# Patient Record
Sex: FEMALE | Race: WHITE | NOT HISPANIC OR LATINO | Employment: OTHER | URBAN - METROPOLITAN AREA
[De-identification: names, ages, dates, MRNs, and addresses within clinical notes are randomized per-mention and may not be internally consistent; named-entity substitution may affect disease eponyms.]

---

## 2021-08-19 ENCOUNTER — OFFICE VISIT (OUTPATIENT)
Dept: CARDIOLOGY | Facility: CLINIC | Age: 66
End: 2021-08-19

## 2021-08-19 VITALS
SYSTOLIC BLOOD PRESSURE: 110 MMHG | HEART RATE: 56 BPM | BODY MASS INDEX: 29.53 KG/M2 | DIASTOLIC BLOOD PRESSURE: 70 MMHG | WEIGHT: 173 LBS | HEIGHT: 64 IN

## 2021-08-19 DIAGNOSIS — E78.41 ELEVATED LIPOPROTEIN(A): ICD-10-CM

## 2021-08-19 DIAGNOSIS — I10 ESSENTIAL HYPERTENSION: ICD-10-CM

## 2021-08-19 DIAGNOSIS — I25.84 CORONARY ARTERY CALCIFICATION: Primary | ICD-10-CM

## 2021-08-19 DIAGNOSIS — I25.10 CORONARY ARTERY CALCIFICATION: Primary | ICD-10-CM

## 2021-08-19 PROCEDURE — 99204 OFFICE O/P NEW MOD 45 MIN: CPT | Performed by: INTERNAL MEDICINE

## 2021-08-19 PROCEDURE — 93000 ELECTROCARDIOGRAM COMPLETE: CPT | Performed by: INTERNAL MEDICINE

## 2021-08-19 RX ORDER — CHOLECALCIFEROL (VITAMIN D3) 125 MCG
4000 CAPSULE ORAL DAILY
COMMUNITY

## 2021-08-19 RX ORDER — LEVOTHYROXINE SODIUM 0.03 MG/1
25 TABLET ORAL DAILY
COMMUNITY
Start: 2021-07-14

## 2021-08-19 RX ORDER — ROSUVASTATIN CALCIUM 10 MG/1
10 TABLET, COATED ORAL DAILY
COMMUNITY
Start: 2021-07-14 | End: 2021-09-21 | Stop reason: SDUPTHER

## 2021-08-19 RX ORDER — LISINOPRIL AND HYDROCHLOROTHIAZIDE 20; 12.5 MG/1; MG/1
TABLET ORAL DAILY
COMMUNITY
Start: 2021-07-14

## 2021-08-19 NOTE — PROGRESS NOTES
Carrie Soto  1955  Date of Office Visit: 08/19/21  Encounter Provider: John Aguilera MD  Place of Service: [facility] CARDIOLOGY      CHIEF COMPLAINT:  Elevated coronary calcium score  Family history of early coronary artery disease and myocardial infarction    HISTORY OF PRESENT ILLNESS: I had the pleasure of seeing Ms. Soto in consultation today.  She is a very pleasant 66-year-old female with a medical history of hyperlipidemia, hypertension and a family history of early coronary artery disease, and early myocardial infarction, who presents to me secondary to her family history but also elevated coronary artery calcium score.  She has been on a statin actually since 2001 which is great for her.  She is currently on Crestor 10 mg p.o. daily.  She tolerates this well with no myalgias.  She has close follow-up with her primary care and did have an elevated lipoprotein a at 528 on her last lab work.  Her LDL was just very slightly up at 83 from goal and total cholesterol looked good at 176.  Her HDL is great at 76.  She denies any chest pain or dyspnea on exertion.  She not had any recent stress testing.  She did have a coronary artery calcium score which I have reviewed.  Her coronary artery calcium score was measured at 309 with calcification in the LAD and RCA above 100 and a score of 56 in the circumflex.  The left main was clean.    Review of Systems   Constitutional: Negative for fever and malaise/fatigue.   HENT: Negative for nosebleeds and sore throat.    Eyes: Negative for blurred vision and double vision.   Cardiovascular: Negative for chest pain, claudication, palpitations and syncope.   Respiratory: Negative for cough, shortness of breath and snoring.    Endocrine: Negative for cold intolerance, heat intolerance and polydipsia.   Skin: Negative for itching, poor wound healing and rash.   Musculoskeletal: Negative for joint pain, joint swelling, muscle  "weakness and myalgias.   Gastrointestinal: Negative for abdominal pain, melena, nausea and vomiting.   Neurological: Negative for light-headedness, loss of balance, seizures, vertigo and weakness.   Psychiatric/Behavioral: Negative for altered mental status and depression.          Past Medical History:   Diagnosis Date   • Cancer (CMS/HCC) 2009    breast/  Chemo and radiation   • Hyperlipidemia    • Hypertension    • Sleep apnea     uses c-pap machine   • Thyroid disorder        The following portions of the patient's history were reviewed and updated as appropriate: Social history , Family history and Surgical history     Current Outpatient Medications on File Prior to Visit   Medication Sig Dispense Refill   • Calcium Carbonate-Vitamin D (CALCIUM-D PO) Take  by mouth Daily.     • Cholecalciferol (Vitamin D3) 50 MCG (2000 UT) tablet Take 4,000 Units by mouth Daily.     • levothyroxine (SYNTHROID, LEVOTHROID) 25 MCG tablet Take 25 mcg by mouth Daily.     • lisinopril-hydrochlorothiazide (PRINZIDE,ZESTORETIC) 20-12.5 MG per tablet Daily.     • rosuvastatin (CRESTOR) 10 MG tablet Take 10 mg by mouth Daily.       No current facility-administered medications on file prior to visit.       Allergies   Allergen Reactions   • Codeine Nausea Only       Vitals:    08/19/21 1029   BP: 110/70   Pulse: 56   Weight: 78.5 kg (173 lb)   Height: 162.6 cm (64\")     Body mass index is 29.7 kg/m².   Constitutional:       Appearance: Well-developed.   Eyes:      General: No scleral icterus.     Conjunctiva/sclera: Conjunctivae normal.   HENT:      Head: Normocephalic and atraumatic.   Neck:      Thyroid: No thyromegaly.      Vascular: Normal carotid pulses. No carotid bruit, hepatojugular reflux or JVD.      Trachea: No tracheal deviation.   Pulmonary:      Effort: No respiratory distress.      Breath sounds: Normal breath sounds. No decreased breath sounds. No wheezing. No rhonchi. No rales.   Chest:      Chest wall: Not tender to " palpatation.   Cardiovascular:      Normal rate. Regular rhythm.      No gallop.   Pulses:     Carotid: 2+ bilaterally.     Radial: 2+ bilaterally.     Femoral: 2+ bilaterally.     Dorsalis pedis: 2+ bilaterally.     Posterior tibial: 2+ bilaterally.  Edema:     Peripheral edema absent.   Abdominal:      General: Bowel sounds are normal. There is no distension.      Palpations: Abdomen is soft.      Tenderness: There is no abdominal tenderness.   Musculoskeletal:         General: No deformity.      Cervical back: Normal range of motion and neck supple. Skin:     Findings: No erythema or rash.   Neurological:      Mental Status: Alert and oriented to person, place, and time.      Sensory: No sensory deficit.   Psychiatric:         Behavior: Behavior normal.              ECG 12 Lead    Date/Time: 8/19/2021 11:08 AM  Performed by: John Aguilera MD  Authorized by: John Aguilera MD   Comparison: not compared with previous ECG   Rhythm: sinus rhythm  Rate: normal  QRS axis: normal    Clinical impression: normal ECG                 DISCUSSION/SUMMARY very pleasant 66-year-old female with a medical history of coronary artery calcification, hyperlipidemia, hypertension and family history of early coronary artery disease.  She presents to me to establish care.  Thankfully she has been on statin therapy for a prolonged period of time and I think this may have played a significant role in keeping her calcification level low.  That being said I do think with her family history and the presence of coronary artery calcification along with elevated lipoprotein a we need to shoot for a lower LDL.  I will increase her Crestor to 20 mg p.o. daily.  She will also be set up for stress testing as well.  Overall I think she is doing fine and I encouraged her to continue to be active and to work on dietary modification.    1.  Coronary artery calcification/hyperlipidemia  -I recommend we double her Crestor to 20 mg p.o.  daily.  She seem to be tolerating this well with no elevation in transaminases or myalgias.  -Standard treadmill stress test will be ordered.  I do not think we need to do any additional evaluation on top of that.    2.  Essential hypertension: Well-controlled.  Continue lisinopril-HCTZ therapy at current dose.  She is tolerating this well with no hyperkalemia or renal insufficiency on this therapy.    I will see her back in 1 year or earlier with problems.

## 2021-08-24 ENCOUNTER — HOSPITAL ENCOUNTER (OUTPATIENT)
Dept: CARDIOLOGY | Facility: HOSPITAL | Age: 66
Discharge: HOME | End: 2021-08-24
Admitting: INTERNAL MEDICINE

## 2021-08-24 DIAGNOSIS — I25.84 CORONARY ARTERY CALCIFICATION: ICD-10-CM

## 2021-08-24 DIAGNOSIS — I25.10 CORONARY ARTERY CALCIFICATION: ICD-10-CM

## 2021-08-24 LAB
BH CV STRESS BP STAGE 1: NORMAL
BH CV STRESS BP STAGE 2: NORMAL
BH CV STRESS BP STAGE 3: NORMAL
BH CV STRESS DURATION MIN STAGE 1: 3
BH CV STRESS DURATION MIN STAGE 2: 3
BH CV STRESS DURATION MIN STAGE 3: 3
BH CV STRESS DURATION MIN STAGE 4: 0
BH CV STRESS DURATION SEC STAGE 1: 0
BH CV STRESS DURATION SEC STAGE 2: 0
BH CV STRESS DURATION SEC STAGE 3: 0
BH CV STRESS DURATION SEC STAGE 4: 30
BH CV STRESS GRADE STAGE 1: 10
BH CV STRESS GRADE STAGE 2: 12
BH CV STRESS GRADE STAGE 3: 14
BH CV STRESS GRADE STAGE 4: 16
BH CV STRESS HR STAGE 1: 93
BH CV STRESS HR STAGE 2: 122
BH CV STRESS HR STAGE 3: 152
BH CV STRESS HR STAGE 4: 158
BH CV STRESS METS STAGE 1: 5
BH CV STRESS METS STAGE 2: 7.5
BH CV STRESS METS STAGE 3: 10
BH CV STRESS METS STAGE 4: 10.5
BH CV STRESS PROTOCOL 1: NORMAL
BH CV STRESS RECOVERY BP: NORMAL MMHG
BH CV STRESS RECOVERY HR: 85 BPM
BH CV STRESS SPEED STAGE 1: 1.7
BH CV STRESS SPEED STAGE 2: 2.5
BH CV STRESS SPEED STAGE 3: 3.4
BH CV STRESS SPEED STAGE 4: 4.2
BH CV STRESS STAGE 1: 1
BH CV STRESS STAGE 2: 2
BH CV STRESS STAGE 3: 3
BH CV STRESS STAGE 4: 4
MAXIMAL PREDICTED HEART RATE: 155 BPM
PERCENT MAX PREDICTED HR: 101.94 %
STRESS BASELINE BP: NORMAL MMHG
STRESS BASELINE HR: 61 BPM
STRESS PERCENT HR: 120 %
STRESS POST ESTIMATED WORKLOAD: 10.5 METS
STRESS POST EXERCISE DUR MIN: 9 MIN
STRESS POST EXERCISE DUR SEC: 30 SEC
STRESS POST PEAK BP: NORMAL MMHG
STRESS POST PEAK HR: 158 BPM
STRESS TARGET HR: 132 BPM

## 2021-08-24 PROCEDURE — 93018 CV STRESS TEST I&R ONLY: CPT | Performed by: INTERNAL MEDICINE

## 2021-08-24 PROCEDURE — 93017 CV STRESS TEST TRACING ONLY: CPT

## 2021-08-24 PROCEDURE — 93016 CV STRESS TEST SUPVJ ONLY: CPT | Performed by: INTERNAL MEDICINE

## 2021-08-25 ENCOUNTER — TELEPHONE (OUTPATIENT)
Dept: CARDIOLOGY | Facility: CLINIC | Age: 66
End: 2021-08-25

## 2021-08-25 NOTE — TELEPHONE ENCOUNTER
----- Message from John Aguilera MD sent at 8/24/2021 11:15 AM EDT -----  Normal stress  Let her know

## 2021-09-21 RX ORDER — ROSUVASTATIN CALCIUM 10 MG/1
10 TABLET, COATED ORAL DAILY
Qty: 90 TABLET | Refills: 3 | Status: SHIPPED | OUTPATIENT
Start: 2021-09-21 | End: 2021-09-21 | Stop reason: SDUPTHER

## 2021-09-21 RX ORDER — ROSUVASTATIN CALCIUM 20 MG/1
20 TABLET, COATED ORAL DAILY
Qty: 90 TABLET | Refills: 3 | Status: SHIPPED | OUTPATIENT
Start: 2021-09-21 | End: 2022-05-23

## 2021-10-20 ENCOUNTER — IMMUNIZATION (OUTPATIENT)
Dept: VACCINE CLINIC | Facility: HOSPITAL | Age: 66
End: 2021-10-20

## 2021-10-20 PROCEDURE — 91300 HC SARSCOV02 VAC 30MCG/0.3ML IM: CPT | Performed by: INTERNAL MEDICINE

## 2021-10-20 PROCEDURE — 0004A ADM SARSCOV2 30MCG/0.3ML BOOSTER: CPT | Performed by: INTERNAL MEDICINE

## 2021-11-01 ENCOUNTER — TELEPHONE (OUTPATIENT)
Dept: GASTROENTEROLOGY | Facility: CLINIC | Age: 66
End: 2021-11-01

## 2021-11-02 ENCOUNTER — PREP FOR SURGERY (OUTPATIENT)
Dept: SURGERY | Facility: SURGERY CENTER | Age: 66
End: 2021-11-02

## 2021-11-02 DIAGNOSIS — Z12.11 ENCOUNTER FOR SCREENING FOR MALIGNANT NEOPLASM OF COLON: Primary | ICD-10-CM

## 2021-11-02 RX ORDER — SODIUM CHLORIDE 0.9 % (FLUSH) 0.9 %
10 SYRINGE (ML) INJECTION AS NEEDED
Status: CANCELLED | OUTPATIENT
Start: 2021-11-02

## 2021-11-02 RX ORDER — SODIUM CHLORIDE 0.9 % (FLUSH) 0.9 %
3 SYRINGE (ML) INJECTION EVERY 12 HOURS SCHEDULED
Status: CANCELLED | OUTPATIENT
Start: 2021-11-02

## 2021-11-02 RX ORDER — SODIUM CHLORIDE, SODIUM LACTATE, POTASSIUM CHLORIDE, CALCIUM CHLORIDE 600; 310; 30; 20 MG/100ML; MG/100ML; MG/100ML; MG/100ML
30 INJECTION, SOLUTION INTRAVENOUS CONTINUOUS PRN
Status: CANCELLED | OUTPATIENT
Start: 2021-11-02

## 2021-11-03 PROBLEM — Z12.11 ENCOUNTER FOR SCREENING FOR MALIGNANT NEOPLASM OF COLON: Status: ACTIVE | Noted: 2021-11-03

## 2022-02-25 RX ORDER — CHOLECALCIFEROL (VITAMIN D3) 125 MCG
500 CAPSULE ORAL DAILY
COMMUNITY
End: 2022-04-14 | Stop reason: ALTCHOICE

## 2022-03-01 ENCOUNTER — ANESTHESIA (OUTPATIENT)
Dept: SURGERY | Facility: SURGERY CENTER | Age: 67
End: 2022-03-01

## 2022-03-01 ENCOUNTER — ANESTHESIA EVENT (OUTPATIENT)
Dept: SURGERY | Facility: SURGERY CENTER | Age: 67
End: 2022-03-01

## 2022-03-01 ENCOUNTER — HOSPITAL ENCOUNTER (OUTPATIENT)
Facility: SURGERY CENTER | Age: 67
Setting detail: HOSPITAL OUTPATIENT SURGERY
Discharge: HOME | End: 2022-03-01
Attending: INTERNAL MEDICINE | Admitting: INTERNAL MEDICINE

## 2022-03-01 VITALS
DIASTOLIC BLOOD PRESSURE: 80 MMHG | OXYGEN SATURATION: 98 % | BODY MASS INDEX: 30.25 KG/M2 | TEMPERATURE: 97.7 F | WEIGHT: 177.2 LBS | HEIGHT: 64 IN | SYSTOLIC BLOOD PRESSURE: 145 MMHG | RESPIRATION RATE: 16 BRPM | HEART RATE: 62 BPM

## 2022-03-01 DIAGNOSIS — Z12.11 ENCOUNTER FOR SCREENING FOR MALIGNANT NEOPLASM OF COLON: ICD-10-CM

## 2022-03-01 PROCEDURE — G0121 COLON CA SCRN NOT HI RSK IND: HCPCS | Performed by: INTERNAL MEDICINE

## 2022-03-01 PROCEDURE — 25010000002 PROPOFOL 10 MG/ML EMULSION: Performed by: ANESTHESIOLOGY

## 2022-03-01 RX ORDER — SODIUM CHLORIDE 0.9 % (FLUSH) 0.9 %
10 SYRINGE (ML) INJECTION AS NEEDED
Status: DISCONTINUED | OUTPATIENT
Start: 2022-03-01 | End: 2022-03-01 | Stop reason: HOSPADM

## 2022-03-01 RX ORDER — PROPOFOL 10 MG/ML
VIAL (ML) INTRAVENOUS AS NEEDED
Status: DISCONTINUED | OUTPATIENT
Start: 2022-03-01 | End: 2022-03-01 | Stop reason: SURG

## 2022-03-01 RX ORDER — PROPOFOL 10 MG/ML
VIAL (ML) INTRAVENOUS CONTINUOUS PRN
Status: DISCONTINUED | OUTPATIENT
Start: 2022-03-01 | End: 2022-03-01 | Stop reason: SURG

## 2022-03-01 RX ORDER — LIDOCAINE HYDROCHLORIDE 20 MG/ML
INJECTION, SOLUTION INFILTRATION; PERINEURAL AS NEEDED
Status: DISCONTINUED | OUTPATIENT
Start: 2022-03-01 | End: 2022-03-01 | Stop reason: SURG

## 2022-03-01 RX ORDER — LIDOCAINE HYDROCHLORIDE 10 MG/ML
0.5 INJECTION, SOLUTION INFILTRATION; PERINEURAL ONCE AS NEEDED
Status: DISCONTINUED | OUTPATIENT
Start: 2022-03-01 | End: 2022-03-01 | Stop reason: HOSPADM

## 2022-03-01 RX ORDER — SODIUM CHLORIDE 0.9 % (FLUSH) 0.9 %
3 SYRINGE (ML) INJECTION EVERY 12 HOURS SCHEDULED
Status: DISCONTINUED | OUTPATIENT
Start: 2022-03-01 | End: 2022-03-01 | Stop reason: HOSPADM

## 2022-03-01 RX ORDER — GLYCOPYRROLATE 0.2 MG/ML
INJECTION INTRAMUSCULAR; INTRAVENOUS AS NEEDED
Status: DISCONTINUED | OUTPATIENT
Start: 2022-03-01 | End: 2022-03-01 | Stop reason: SURG

## 2022-03-01 RX ORDER — SODIUM CHLORIDE, SODIUM LACTATE, POTASSIUM CHLORIDE, CALCIUM CHLORIDE 600; 310; 30; 20 MG/100ML; MG/100ML; MG/100ML; MG/100ML
30 INJECTION, SOLUTION INTRAVENOUS CONTINUOUS PRN
Status: DISCONTINUED | OUTPATIENT
Start: 2022-03-01 | End: 2022-03-01 | Stop reason: HOSPADM

## 2022-03-01 RX ORDER — SODIUM CHLORIDE, SODIUM LACTATE, POTASSIUM CHLORIDE, CALCIUM CHLORIDE 600; 310; 30; 20 MG/100ML; MG/100ML; MG/100ML; MG/100ML
1000 INJECTION, SOLUTION INTRAVENOUS CONTINUOUS
Status: DISCONTINUED | OUTPATIENT
Start: 2022-03-01 | End: 2022-03-01 | Stop reason: HOSPADM

## 2022-03-01 RX ADMIN — Medication 140 MCG/KG/MIN: at 08:51

## 2022-03-01 RX ADMIN — GLYCOPYRROLATE 0.1 MG: 0.2 INJECTION, SOLUTION INTRAMUSCULAR; INTRAVENOUS at 08:48

## 2022-03-01 RX ADMIN — PROPOFOL 120 MG: 10 INJECTION, EMULSION INTRAVENOUS at 08:51

## 2022-03-01 RX ADMIN — LIDOCAINE HYDROCHLORIDE 60 MG: 20 INJECTION, SOLUTION INFILTRATION; PERINEURAL at 08:51

## 2022-03-01 RX ADMIN — SODIUM CHLORIDE, POTASSIUM CHLORIDE, SODIUM LACTATE AND CALCIUM CHLORIDE 1000 ML: 600; 310; 30; 20 INJECTION, SOLUTION INTRAVENOUS at 08:22

## 2022-03-01 NOTE — H&P
No chief complaint on file.      HPI  screening         Problem List:    Patient Active Problem List   Diagnosis   • Essential hypertension   • Elevated lipoprotein(a)   • Coronary artery calcification   • Encounter for screening for malignant neoplasm of colon       Medical History:    Past Medical History:   Diagnosis Date   • Cancer (HCC)     breast/  Chemo and radiation   • Hyperlipidemia    • Hypertension    • Sleep apnea     uses c-pap machine   • Thyroid disorder         Social History:    Social History     Socioeconomic History   • Marital status:    Tobacco Use   • Smoking status: Never Smoker   • Smokeless tobacco: Never Used   Vaping Use   • Vaping Use: Never used   Substance and Sexual Activity   • Alcohol use: Yes     Comment: occ /  No caffeine use   • Drug use: Never       Family History:   Family History   Problem Relation Age of Onset   • Heart attack Mother    • Heart disease Sister         2 bypass surgeries   • Hyperlipidemia Sister    • Breast cancer Sister    • Heart attack Brother    • Heart disease Brother    • Other Brother         heart murmur   • Hypertension Brother    • Hyperlipidemia Brother    • Diabetes Brother    • Heart attack Maternal Aunt    • Heart disease Maternal Uncle    • Heart attack Maternal Grandmother    • Heart attack Brother    • Heart disease Brother    • Hyperlipidemia Brother    • Heart attack Brother    • Heart disease Brother    • Diabetes Brother    • Hypertension Sister    • Hyperlipidemia Sister    • Diabetes Sister    • Arrhythmia Nephew         2 heart ablations   • Arrhythmia Niece        Surgical History:   Past Surgical History:   Procedure Laterality Date   • BREAST LUMPECTOMY     •  SECTION     • HYSTERECTOMY         No current facility-administered medications for this encounter.    Current Outpatient Medications:   •  Calcium Carbonate-Vitamin D (CALCIUM-D PO), Take  by mouth Daily., Disp: , Rfl:   •  Cholecalciferol (Vitamin  D3) 50 MCG (2000 UT) tablet, Take 4,000 Units by mouth Daily., Disp: , Rfl:   •  levothyroxine (SYNTHROID, LEVOTHROID) 25 MCG tablet, Take 25 mcg by mouth Daily., Disp: , Rfl:   •  lisinopril-hydrochlorothiazide (PRINZIDE,ZESTORETIC) 20-12.5 MG per tablet, Daily., Disp: , Rfl:   •  rosuvastatin (CRESTOR) 20 MG tablet, Take 1 tablet by mouth Daily., Disp: 90 tablet, Rfl: 3  •  vitamin B-12 (CYANOCOBALAMIN) 500 MCG tablet, Take 500 mcg by mouth Daily., Disp: , Rfl:     Allergies:   Allergies   Allergen Reactions   • Codeine Nausea Only        The following portions of the patient's history were reviewed by me and updated as appropriate: review of systems, allergies, current medications, past family history, past medical history, past social history, past surgical history and problem list.    There were no vitals filed for this visit.    PHYSICAL EXAM:    CONSTITUTIONAL:  today's vital signs reviewed by me  GASTROINTESTINAL: abdomen is soft nontender nondistended with normal active bowel sounds, no masses are appreciated    Assessment/ Plan  Screening    colonoscopy    Risks and benefits as well as alternatives to endoscopic evaluation were explained to the patient and they voiced understanding and wish to proceed.  These risks include but are not limited to the risk of bleeding, perforation, adverse reaction to sedation, and missed lesions.  The patient was given the opportunity to ask questions prior to the endoscopic procedure.

## 2022-03-01 NOTE — ANESTHESIA POSTPROCEDURE EVALUATION
Patient: Carrie Soto    Procedure Summary     Date: 03/01/22 Room / Location: [facility]  / [facility]    Anesthesia Start: 0845 Anesthesia Stop: 0913    Procedure: COLONOSCOPY (N/A ) Diagnosis:       Encounter for screening for malignant neoplasm of colon      (Encounter for screening for malignant neoplasm of colon [Z12.11])    Surgeons: John Osborne MD Provider: Cisco Crowley MD    Anesthesia Type: MAC ASA Status: 3          Anesthesia Type: MAC    Vitals  Vitals Value Taken Time   /69 03/01/22 0910   Temp 36.5 °C (97.7 °F) 03/01/22 0910   Pulse 69 03/01/22 0910   Resp 16 03/01/22 0910   SpO2 99 % 03/01/22 0910           Post Anesthesia Care and Evaluation    Patient location during evaluation: PHASE II  Patient participation: waiting for patient participation  Level of consciousness: sleepy but conscious  Pain management: adequate  Airway patency: patent    Cardiovascular status: acceptable  Respiratory status: acceptable  Hydration status: acceptable

## 2022-03-01 NOTE — ANESTHESIA PREPROCEDURE EVALUATION
Anesthesia Evaluation     Patient summary reviewed and Nursing notes reviewed   NPO Solid Status: > 8 hours  NPO Liquid Status: > 2 hours           Airway   Mallampati: II  TM distance: >3 FB  Neck ROM: full  Dental - normal exam     Pulmonary - normal exam    breath sounds clear to auscultation  (+) sleep apnea on CPAP,   Cardiovascular - normal exam    Rhythm: regular  Rate: normal    (+) hypertension, CAD, hyperlipidemia,   (-) angina, orthopnea, PND, MCQUEEN      Neuro/Psych- negative ROS  GI/Hepatic/Renal/Endo    (+) obesity,   thyroid problem hypothyroidism    Musculoskeletal (-) negative ROS    Abdominal    Substance History - negative use     OB/GYN negative ob/gyn ROS         Other      history of cancer                  Anesthesia Plan    ASA 3     MAC       Anesthetic plan, all risks, benefits, and alternatives have been provided, discussed and informed consent has been obtained with: patient.        CODE STATUS:

## 2022-04-14 ENCOUNTER — OFFICE VISIT (OUTPATIENT)
Dept: CARDIOLOGY | Facility: CLINIC | Age: 67
End: 2022-04-14

## 2022-04-14 VITALS
BODY MASS INDEX: 31.41 KG/M2 | SYSTOLIC BLOOD PRESSURE: 134 MMHG | HEIGHT: 64 IN | WEIGHT: 184 LBS | DIASTOLIC BLOOD PRESSURE: 80 MMHG | HEART RATE: 70 BPM

## 2022-04-14 DIAGNOSIS — I25.10 CORONARY ARTERY CALCIFICATION: Primary | ICD-10-CM

## 2022-04-14 DIAGNOSIS — I10 ESSENTIAL HYPERTENSION: ICD-10-CM

## 2022-04-14 DIAGNOSIS — I25.84 CORONARY ARTERY CALCIFICATION: Primary | ICD-10-CM

## 2022-04-14 PROCEDURE — 93000 ELECTROCARDIOGRAM COMPLETE: CPT | Performed by: INTERNAL MEDICINE

## 2022-04-14 PROCEDURE — 99214 OFFICE O/P EST MOD 30 MIN: CPT | Performed by: INTERNAL MEDICINE

## 2022-04-14 RX ORDER — CYANOCOBALAMIN (VITAMIN B-12) 1000 MCG
1 TABLET ORAL
COMMUNITY

## 2022-04-14 NOTE — PROGRESS NOTES
Carrie MAYER Charles  1955  Date of Office Visit: 04/14/22  Encounter Provider: John Aguilera MD  Place of Service: [facility] CARDIOLOGY      CHIEF COMPLAINT:  Elevated coronary calcium score  Family history of early coronary artery disease and myocardial infarction    HISTORY OF PRESENT ILLNESS:   67 year-old female with a medical history of hyperlipidemia, hypertension and a family history of early coronary artery disease, and early myocardial infarction, who initially presented to me secondary to her family history but also elevated coronary artery calcium score.  She has been on a statin actually since 2001 which is great for her.  She is currently on Crestor 10 mg p.o. daily.  She tolerates this well with no myalgias.  She has close follow-up with her primary care and did have an elevated lipoprotein a at 528 on her last lab work.  Her LDL was just very slightly up at 83 from goal and total cholesterol looked good at 176.  Her HDL is great at 76.    She did have a coronary artery calcium score which I have reviewed.  Her coronary artery calcium score was measured at 309 with calcification in the LAD and RCA above 100 and a score of 56 in the circumflex.  The left main was clean.    She subsequently was sent for standard treadmill stress test and did very well with that.  She went 9 minutes with no ischemic changes.  No chest pain reported.  She has been very well since that time.  Her Crestor has been increased to 20 mg p.o. nightly and she is currently tolerating that well with no significant issues.  She denies orthopnea or PND.    Review of Systems   Constitutional: Negative for fever and malaise/fatigue.   HENT: Negative for nosebleeds and sore throat.    Eyes: Negative for blurred vision and double vision.   Cardiovascular: Negative for chest pain, claudication, palpitations and syncope.   Respiratory: Negative for cough, shortness of breath and snoring.   "  Endocrine: Negative for cold intolerance, heat intolerance and polydipsia.   Skin: Negative for itching, poor wound healing and rash.   Musculoskeletal: Negative for joint pain, joint swelling, muscle weakness and myalgias.   Gastrointestinal: Negative for abdominal pain, melena, nausea and vomiting.   Neurological: Negative for light-headedness, loss of balance, seizures, vertigo and weakness.   Psychiatric/Behavioral: Negative for altered mental status and depression.          Past Medical History:   Diagnosis Date   • Cancer (HCC) 2009    breast/  Chemo and radiation   • Hyperlipidemia    • Hypertension    • Sleep apnea     uses c-pap machine   • Thyroid disorder        The following portions of the patient's history were reviewed and updated as appropriate: Social history , Family history and Surgical history     Current Outpatient Medications on File Prior to Visit   Medication Sig Dispense Refill   • Calcium Carbonate-Vitamin D (CALCIUM-D PO) Take  by mouth Daily.     • Cholecalciferol (Vitamin D3) 50 MCG (2000 UT) tablet Take 4,000 Units by mouth Daily.     • levothyroxine (SYNTHROID, LEVOTHROID) 25 MCG tablet Take 25 mcg by mouth Daily.     • lisinopril-hydrochlorothiazide (PRINZIDE,ZESTORETIC) 20-12.5 MG per tablet Daily.     • rosuvastatin (CRESTOR) 20 MG tablet Take 1 tablet by mouth Daily. 90 tablet 3   • [DISCONTINUED] vitamin B-12 (CYANOCOBALAMIN) 500 MCG tablet Take 500 mcg by mouth Daily.       No current facility-administered medications on file prior to visit.       Allergies   Allergen Reactions   • Codeine Nausea Only       Vitals:    04/14/22 1530   Height: 162.6 cm (64\")     Body mass index is 30.42 kg/m².   Constitutional:       Appearance: Well-developed.   Eyes:      General: No scleral icterus.     Conjunctiva/sclera: Conjunctivae normal.   HENT:      Head: Normocephalic and atraumatic.   Neck:      Thyroid: No thyromegaly.      Vascular: Normal carotid pulses. No carotid bruit, " hepatojugular reflux or JVD.      Trachea: No tracheal deviation.   Pulmonary:      Effort: No respiratory distress.      Breath sounds: Normal breath sounds. No decreased breath sounds. No wheezing. No rhonchi. No rales.   Chest:      Chest wall: Not tender to palpatation.   Cardiovascular:      Normal rate. Regular rhythm.      No gallop.   Pulses:     Carotid: 2+ bilaterally.     Radial: 2+ bilaterally.     Femoral: 2+ bilaterally.     Dorsalis pedis: 2+ bilaterally.     Posterior tibial: 2+ bilaterally.  Edema:     Peripheral edema absent.   Abdominal:      General: Bowel sounds are normal. There is no distension.      Palpations: Abdomen is soft.      Tenderness: There is no abdominal tenderness.   Musculoskeletal:         General: No deformity.      Cervical back: Normal range of motion and neck supple. Skin:     Findings: No erythema or rash.   Neurological:      Mental Status: Alert and oriented to person, place, and time.      Sensory: No sensory deficit.   Psychiatric:         Behavior: Behavior normal.              ECG 12 Lead    Date/Time: 4/14/2022 3:57 PM  Performed by: John Aguilera MD  Authorized by: oJhn Aguilera MD   Comparison: compared with previous ECG from 8/19/2021  Similar to previous ECG  Rhythm: sinus rhythm  Rate: normal  QRS axis: normal    Clinical impression: normal ECG                 DISCUSSION/SUMMARY  67-year-old female with a medical history of coronary artery calcification, hyperlipidemia, hypertension and family history of early coronary artery disease.  She presents to me in follow-up and has been doing very well.  She is tolerating the higher dose of Crestor therapy without significant limitation.  She denies any chest pain or dyspnea on exertion.    1.  Coronary artery calcification/hyperlipidemia  -Continue increased dose of Crestor 20 mg p.o. daily.  She seem to be tolerating this well with no elevation in transaminases or myalgias.  I do not think her left  ankle pain has anything to do with her statin therapy.  -Standard treadmill stress test reviewed.  No ischemia.  No recent issues with chest pain.    2.  Essential hypertension: Well-controlled.  Continue lisinopril-HCTZ therapy at current dose.  She is tolerating this well with no hyperkalemia or renal insufficiency on this therapy.    I will see her back in 1 year or earlier with problems.

## 2022-05-23 RX ORDER — PRAVASTATIN SODIUM 40 MG
40 TABLET ORAL DAILY
Qty: 30 TABLET | Refills: 6 | Status: SHIPPED | OUTPATIENT
Start: 2022-05-23 | End: 2022-11-21

## 2022-05-24 ENCOUNTER — IMMUNIZATION (OUTPATIENT)
Dept: VACCINE CLINIC | Facility: HOSPITAL | Age: 67
End: 2022-05-24

## 2022-05-24 DIAGNOSIS — Z23 NEED FOR VACCINATION: Primary | ICD-10-CM

## 2022-05-24 PROCEDURE — 0054A HC ADM SARSCV2 30MCG TRS-SUCR BOOSTER: CPT | Performed by: INTERNAL MEDICINE

## 2022-05-24 PROCEDURE — 91305 HC SARSCOV2 VAC 30 MCG TRS-SUCR PFIZER: CPT | Performed by: INTERNAL MEDICINE

## 2022-08-31 ENCOUNTER — TRANSCRIBE ORDERS (OUTPATIENT)
Dept: ADMINISTRATIVE | Facility: HOSPITAL | Age: 67
End: 2022-08-31

## 2022-08-31 DIAGNOSIS — I99.9 VASCULAR DISEASE: Primary | ICD-10-CM

## 2022-09-01 ENCOUNTER — HOSPITAL ENCOUNTER (OUTPATIENT)
Dept: CARDIOLOGY | Facility: HOSPITAL | Age: 67
Discharge: HOME | End: 2022-09-01
Admitting: INTERNAL MEDICINE

## 2022-09-01 DIAGNOSIS — I99.9 VASCULAR DISEASE: ICD-10-CM

## 2022-09-01 LAB
BH CV XLRA MEAS LEFT DIST CCA EDV: -21.7 CM/SEC
BH CV XLRA MEAS LEFT DIST CCA PSV: -73.7 CM/SEC
BH CV XLRA MEAS LEFT DIST ICA EDV: -36.4 CM/SEC
BH CV XLRA MEAS LEFT DIST ICA PSV: -111.8 CM/SEC
BH CV XLRA MEAS LEFT ICA/CCA RATIO: 1.52
BH CV XLRA MEAS LEFT MID ICA EDV: -32.9 CM/SEC
BH CV XLRA MEAS LEFT MID ICA PSV: -80.6 CM/SEC
BH CV XLRA MEAS LEFT PROX CCA EDV: 23.4 CM/SEC
BH CV XLRA MEAS LEFT PROX CCA PSV: 98.8 CM/SEC
BH CV XLRA MEAS LEFT PROX ECA EDV: -13.9 CM/SEC
BH CV XLRA MEAS LEFT PROX ECA PSV: -76.3 CM/SEC
BH CV XLRA MEAS LEFT PROX ICA EDV: -26.9 CM/SEC
BH CV XLRA MEAS LEFT PROX ICA PSV: -65.9 CM/SEC
BH CV XLRA MEAS LEFT PROX SCLA PSV: 111.8 CM/SEC
BH CV XLRA MEAS LEFT VERTEBRAL A EDV: 19.6 CM/SEC
BH CV XLRA MEAS LEFT VERTEBRAL A PSV: 56.4 CM/SEC
BH CV XLRA MEAS RIGHT DIST CCA EDV: -20.5 CM/SEC
BH CV XLRA MEAS RIGHT DIST CCA PSV: -95.1 CM/SEC
BH CV XLRA MEAS RIGHT DIST ICA EDV: -39.2 CM/SEC
BH CV XLRA MEAS RIGHT DIST ICA PSV: -121.2 CM/SEC
BH CV XLRA MEAS RIGHT ICA/CCA RATIO: 1.27
BH CV XLRA MEAS RIGHT MID ICA EDV: -31.1 CM/SEC
BH CV XLRA MEAS RIGHT MID ICA PSV: -101.3 CM/SEC
BH CV XLRA MEAS RIGHT PROX CCA EDV: -23 CM/SEC
BH CV XLRA MEAS RIGHT PROX CCA PSV: -96.3 CM/SEC
BH CV XLRA MEAS RIGHT PROX ECA EDV: -11.8 CM/SEC
BH CV XLRA MEAS RIGHT PROX ECA PSV: -84.5 CM/SEC
BH CV XLRA MEAS RIGHT PROX ICA EDV: -33.5 CM/SEC
BH CV XLRA MEAS RIGHT PROX ICA PSV: -91.9 CM/SEC
BH CV XLRA MEAS RIGHT PROX SCLA PSV: -137.8 CM/SEC
BH CV XLRA MEAS RIGHT VERTEBRAL A EDV: -14.8 CM/SEC
BH CV XLRA MEAS RIGHT VERTEBRAL A PSV: -47.7 CM/SEC
LEFT ARM BP: NORMAL MMHG
MAXIMAL PREDICTED HEART RATE: 154 BPM
RIGHT ARM BP: NORMAL MMHG
STRESS TARGET HR: 131 BPM

## 2022-09-01 PROCEDURE — 93880 EXTRACRANIAL BILAT STUDY: CPT

## 2022-10-04 ENCOUNTER — IMMUNIZATION (OUTPATIENT)
Dept: VACCINE CLINIC | Facility: HOSPITAL | Age: 67
End: 2022-10-04

## 2022-10-04 DIAGNOSIS — Z23 NEED FOR VACCINATION: Primary | ICD-10-CM

## 2022-10-04 PROCEDURE — 0124A: CPT | Performed by: INTERNAL MEDICINE

## 2022-10-04 PROCEDURE — 91312 HC SARSCOV2 VAC 30MCG/0.3ML IM BIVALENT BOOSTER 12 YRS AND OLDER: CPT | Performed by: INTERNAL MEDICINE

## 2022-11-21 RX ORDER — PRAVASTATIN SODIUM 40 MG
TABLET ORAL
Qty: 90 TABLET | Refills: 2 | Status: SHIPPED | OUTPATIENT
Start: 2022-11-21

## 2023-04-20 ENCOUNTER — OFFICE VISIT (OUTPATIENT)
Dept: CARDIOLOGY | Facility: CLINIC | Age: 68
End: 2023-04-20
Payer: MEDICARE

## 2023-04-20 VITALS
HEIGHT: 64 IN | HEART RATE: 58 BPM | DIASTOLIC BLOOD PRESSURE: 80 MMHG | BODY MASS INDEX: 30.22 KG/M2 | WEIGHT: 177 LBS | SYSTOLIC BLOOD PRESSURE: 142 MMHG

## 2023-04-20 DIAGNOSIS — E78.41 ELEVATED LIPOPROTEIN(A): ICD-10-CM

## 2023-04-20 DIAGNOSIS — I25.84 CORONARY ARTERY CALCIFICATION: Primary | ICD-10-CM

## 2023-04-20 DIAGNOSIS — I10 ESSENTIAL HYPERTENSION: ICD-10-CM

## 2023-04-20 DIAGNOSIS — I25.10 CORONARY ARTERY CALCIFICATION: Primary | ICD-10-CM

## 2023-04-20 PROCEDURE — 93000 ELECTROCARDIOGRAM COMPLETE: CPT | Performed by: INTERNAL MEDICINE

## 2023-04-20 PROCEDURE — 99214 OFFICE O/P EST MOD 30 MIN: CPT | Performed by: INTERNAL MEDICINE

## 2023-04-20 PROCEDURE — 3077F SYST BP >= 140 MM HG: CPT | Performed by: INTERNAL MEDICINE

## 2023-04-20 PROCEDURE — 3079F DIAST BP 80-89 MM HG: CPT | Performed by: INTERNAL MEDICINE

## 2023-04-20 RX ORDER — LISINOPRIL 10 MG/1
10 TABLET ORAL DAILY
COMMUNITY

## 2023-04-20 NOTE — PROGRESS NOTES
Carrie MAYER Charles  1955  Date of Office Visit: 04/20/23  Encounter Provider: John Aguilera MD  Place of Service: [facility] CARDIOLOGY      CHIEF COMPLAINT:  Elevated coronary calcium score  Family history of early coronary artery disease and myocardial infarction    HISTORY OF PRESENT ILLNESS:   68 year-old female with a medical history of hyperlipidemia, hypertension and a family history of early coronary artery disease, and early myocardial infarction, who initially presented to me secondary to her family history but also elevated coronary artery calcium score.  She has been on a statin actually since 2001 which is great for her. .  She tolerates this well with no myalgias.  She has close follow-up with her primary care and did have an elevated lipoprotein a at 528 on her last lab work.  Her LDL was just very slightly up at 83 from goal and total cholesterol looked good at 176.  Her HDL is great at 76.    She did have a coronary artery calcium score which I have reviewed.  Her coronary artery calcium score was measured at 309 with calcification in the LAD and RCA above 100 and a score of 56 in the circumflex.  The left main was clean.    She subsequently was sent for standard treadmill stress test and did very well with that.  She went 9 minutes with no ischemic changes.  No chest pain reported.  She has been very well since that time. She had myalgias on Crestor and moved over to Pravastatin. However, her LDL increased and she was moved back to Crestor at 20mg daily.       Review of Systems   Constitutional: Negative for fever and malaise/fatigue.   HENT: Negative for nosebleeds and sore throat.    Eyes: Negative for blurred vision and double vision.   Cardiovascular: Negative for chest pain, claudication, palpitations and syncope.   Respiratory: Negative for cough, shortness of breath and snoring.    Endocrine: Negative for cold intolerance, heat intolerance and  "polydipsia.   Skin: Negative for itching, poor wound healing and rash.   Musculoskeletal: Negative for joint pain, joint swelling, muscle weakness and myalgias.   Gastrointestinal: Negative for abdominal pain, melena, nausea and vomiting.   Neurological: Negative for light-headedness, loss of balance, seizures, vertigo and weakness.   Psychiatric/Behavioral: Negative for altered mental status and depression.          Past Medical History:   Diagnosis Date   • Cancer 2009    breast/  Chemo and radiation   • Hyperlipidemia    • Hypertension    • Sleep apnea     uses c-pap machine   • Thyroid disorder        The following portions of the patient's history were reviewed and updated as appropriate: Social history , Family history and Surgical history     Current Outpatient Medications on File Prior to Visit   Medication Sig Dispense Refill   • Calcium Carbonate-Vitamin D (CALCIUM-D PO) Take  by mouth Daily.     • Cholecalciferol (Vitamin D3) 50 MCG (2000 UT) tablet Take 3 tablets by mouth Daily.     • Cyanocobalamin (B-12) 1000 MCG tablet Take 1 tablet by mouth Daily With Lunch.     • levothyroxine (SYNTHROID, LEVOTHROID) 25 MCG tablet Take 1 tablet by mouth Daily.     • lisinopril (PRINIVIL,ZESTRIL) 10 MG tablet Take 1 tablet by mouth Daily.     • lisinopril-hydrochlorothiazide (PRINZIDE,ZESTORETIC) 20-12.5 MG per tablet Daily.     • Rosuvastatin Calcium 20 MG capsule sprinkle Take 1 tablet by mouth Daily.     • [DISCONTINUED] pravastatin (PRAVACHOL) 40 MG tablet TAKE 1 TABLET BY MOUTH EVERY DAY 90 tablet 2     No current facility-administered medications on file prior to visit.       Allergies   Allergen Reactions   • Codeine Nausea Only       Vitals:    04/20/23 1053   BP: 142/80   Pulse: 58   Weight: 80.3 kg (177 lb)   Height: 162.6 cm (64\")     Body mass index is 30.38 kg/m².   Constitutional:       Appearance: Well-developed.   Eyes:      General: No scleral icterus.     Conjunctiva/sclera: Conjunctivae normal. "   HENT:      Head: Normocephalic and atraumatic.   Neck:      Thyroid: No thyromegaly.      Vascular: Normal carotid pulses. No carotid bruit, hepatojugular reflux or JVD.      Trachea: No tracheal deviation.   Pulmonary:      Effort: No respiratory distress.      Breath sounds: Normal breath sounds. No decreased breath sounds. No wheezing. No rhonchi. No rales.   Chest:      Chest wall: Not tender to palpatation.   Cardiovascular:      Normal rate. Regular rhythm.      No gallop.   Pulses:     Carotid: 2+ bilaterally.     Radial: 2+ bilaterally.     Femoral: 2+ bilaterally.     Dorsalis pedis: 2+ bilaterally.     Posterior tibial: 2+ bilaterally.  Edema:     Peripheral edema absent.   Abdominal:      General: Bowel sounds are normal. There is no distension.      Palpations: Abdomen is soft.      Tenderness: There is no abdominal tenderness.   Musculoskeletal:         General: No deformity.      Cervical back: Normal range of motion and neck supple. Skin:     Findings: No erythema or rash.   Neurological:      Mental Status: Alert and oriented to person, place, and time.      Sensory: No sensory deficit.   Psychiatric:         Behavior: Behavior normal.              ECG 12 Lead    Date/Time: 4/20/2023 11:25 AM  Performed by: John Aguilera MD  Authorized by: John Aguilera MD   Comparison: compared with previous ECG from 4/14/2022  Similar to previous ECG  Rhythm: sinus rhythm  Rate: normal  QRS axis: normal    Clinical impression: normal ECG                 DISCUSSION/SUMMARY  68-year-old female with a medical history of coronary artery calcification, hyperlipidemia, hypertension and family history of early coronary artery disease.  She presents to me in follow-up and has been doing very well.   I have reviewed her recent lab work and her lipid panel is not well controlled.  She has previously been moved back to [facility] and seems to be tolerating that fine.    1.  Coronary artery  calcification/hyperlipidemia  -LDL was 93 on more recent lab work 2023. Continue Crestor at current dose. Consider increase in therapy if LDL not controlled on next check.   -Standard treadmill stress test reviewed.  No ischemia.  No recent issues with chest pain.    2.  Essential hypertension: Reasonably well controlled.  Continue lisinopril-HCTZ therapy at current dose.  She is tolerating this well with no hyperkalemia  -We may need to consider increasing lisinopril again if blood pressure stays elevated.  Hold off at this time.    I will see her back in 1 year or earlier with problems.

## 2023-08-29 ENCOUNTER — TELEPHONE (OUTPATIENT)
Dept: CARDIOLOGY | Facility: CLINIC | Age: 68
End: 2023-08-29
Payer: MEDICARE

## 2023-08-29 NOTE — TELEPHONE ENCOUNTER
Dr. Pallares is requesting to speak with Dr. Aguilera regarding Carrie LEYLA Soto.  Dr. Pallares said the call is non-urgent and return call is ok either today or tomorrow.    Thank you,  Yamel CLEARY RN  Triage Nurse [facility]   14:49 EDT

## 2024-04-29 ENCOUNTER — OFFICE VISIT (OUTPATIENT)
Age: 69
End: 2024-04-29
Payer: MEDICARE

## 2024-04-29 VITALS
HEIGHT: 64 IN | HEART RATE: 71 BPM | SYSTOLIC BLOOD PRESSURE: 122 MMHG | WEIGHT: 167 LBS | DIASTOLIC BLOOD PRESSURE: 60 MMHG | BODY MASS INDEX: 28.51 KG/M2

## 2024-04-29 DIAGNOSIS — E78.41 ELEVATED LIPOPROTEIN(A): ICD-10-CM

## 2024-04-29 DIAGNOSIS — I25.84 CORONARY ARTERY CALCIFICATION: ICD-10-CM

## 2024-04-29 DIAGNOSIS — I10 ESSENTIAL HYPERTENSION: Primary | ICD-10-CM

## 2024-04-29 DIAGNOSIS — I25.10 CORONARY ARTERY CALCIFICATION: ICD-10-CM

## 2024-04-29 PROCEDURE — 3078F DIAST BP <80 MM HG: CPT | Performed by: INTERNAL MEDICINE

## 2024-04-29 PROCEDURE — 3074F SYST BP LT 130 MM HG: CPT | Performed by: INTERNAL MEDICINE

## 2024-04-29 PROCEDURE — 99214 OFFICE O/P EST MOD 30 MIN: CPT | Performed by: INTERNAL MEDICINE

## 2024-04-29 PROCEDURE — 93000 ELECTROCARDIOGRAM COMPLETE: CPT | Performed by: INTERNAL MEDICINE

## 2024-04-29 RX ORDER — ROSUVASTATIN CALCIUM 20 MG/1
20 TABLET, COATED ORAL DAILY
COMMUNITY
Start: 2024-02-25

## 2024-04-29 RX ORDER — EVOLOCUMAB 140 MG/ML
140 INJECTION, SOLUTION SUBCUTANEOUS
COMMUNITY

## 2024-04-29 NOTE — PROGRESS NOTES
Carrie MAYER Charles  1955  Date of Office Visit: 04/29/24  Encounter Provider: John Aguilera MD  Place of Service: [facility] CARDIOLOGY      CHIEF COMPLAINT:  Elevated coronary calcium score  Family history of early coronary artery disease and myocardial infarction  Mixed hyperlipidemia    HISTORY OF PRESENT ILLNESS:   69 year-old female with a medical history of hyperlipidemia, hypertension and a family history of early coronary artery disease, and early myocardial infarction, who initially presented to me secondary to her family history but also elevated coronary artery calcium score.  She has had an elevated LDL and lipoprotein-a   she did have a coronary artery calcium score which I have reviewed.  Her coronary artery calcium score was measured at 309 with calcification in the LAD and RCA above 100 and a score of 56 in the circumflex.  The left main was clean.    She subsequently was sent for standard treadmill stress test in 2021 and did very well with that.  She went 9 minutes with no ischemic changes.  No chest pain reported.  She has been very well since that time. She had myalgias on Crestor and moved over to Pravastatin. However, her LDL increased and she was moved back to Crestor at 20mg daily.  She continues on that therapy.  Her last LDL was still slightly elevated at 91 and she was started on Repatha therapy. She subsequently had repeat LDL of 34. Lipoprotein a still elevatd at 503      Review of Systems   Constitutional: Negative for fever and malaise/fatigue.   HENT:  Negative for nosebleeds and sore throat.    Eyes:  Negative for blurred vision and double vision.   Cardiovascular:  Negative for chest pain, claudication, palpitations and syncope.   Respiratory:  Negative for cough, shortness of breath and snoring.    Endocrine: Negative for cold intolerance, heat intolerance and polydipsia.   Skin:  Negative for itching, poor wound healing and rash.  "  Musculoskeletal:  Negative for joint pain, joint swelling, muscle weakness and myalgias.   Gastrointestinal:  Negative for abdominal pain, melena, nausea and vomiting.   Neurological:  Negative for light-headedness, loss of balance, seizures, vertigo and weakness.   Psychiatric/Behavioral:  Negative for altered mental status and depression.           Past Medical History:   Diagnosis Date    Cancer 2009    breast/  Chemo and radiation    Hyperlipidemia     Hypertension     Sleep apnea     uses c-pap machine    Thyroid disorder        The following portions of the patient's history were reviewed and updated as appropriate: Social history , Family history and Surgical history     Current Outpatient Medications on File Prior to Visit   Medication Sig Dispense Refill    Calcium Carbonate-Vitamin D (CALCIUM-D PO) Take  by mouth Daily.      Cholecalciferol (Vitamin D3) 50 MCG (2000 UT) tablet Take 3 tablets by mouth Daily.      Cyanocobalamin (B-12) 1000 MCG tablet Take 1 tablet by mouth Daily With Lunch.      levothyroxine (SYNTHROID, LEVOTHROID) 25 MCG tablet Take 1 tablet by mouth Daily.      lisinopril (PRINIVIL,ZESTRIL) 10 MG tablet Take 1 tablet by mouth Daily.      lisinopril-hydrochlorothiazide (PRINZIDE,ZESTORETIC) 20-12.5 MG per tablet Daily.      Repatha SureClick solution auto-injector SureClick injection Inject 1 mL under the skin into the appropriate area as directed Every 14 (Fourteen) Days.      rosuvastatin (CRESTOR) 20 MG tablet Take 1 tablet by mouth Daily.      [DISCONTINUED] Rosuvastatin Calcium 20 MG capsule sprinkle Take 1 tablet by mouth Daily.       No current facility-administered medications on file prior to visit.       Allergies   Allergen Reactions    Codeine Nausea Only       Vitals:    04/29/24 1235   BP: 122/60   Pulse: 71   Weight: 75.8 kg (167 lb)   Height: 162.6 cm (64\")       Body mass index is 28.67 kg/m².   Constitutional:       Appearance: Well-developed.   Eyes:      General: No " scleral icterus.     Conjunctiva/sclera: Conjunctivae normal.   HENT:      Head: Normocephalic and atraumatic.   Neck:      Thyroid: No thyromegaly.      Vascular: Normal carotid pulses. No carotid bruit, hepatojugular reflux or JVD.      Trachea: No tracheal deviation.   Pulmonary:      Effort: No respiratory distress.      Breath sounds: Normal breath sounds. No decreased breath sounds. No wheezing. No rhonchi. No rales.   Chest:      Chest wall: Not tender to palpatation.   Cardiovascular:      Normal rate. Regular rhythm.      No gallop.    Pulses:     Carotid: 2+ bilaterally.     Radial: 2+ bilaterally.     Femoral: 2+ bilaterally.     Dorsalis pedis: 2+ bilaterally.     Posterior tibial: 2+ bilaterally.  Edema:     Peripheral edema absent.   Abdominal:      General: Bowel sounds are normal. There is no distension.      Palpations: Abdomen is soft.      Tenderness: There is no abdominal tenderness.   Musculoskeletal:         General: No deformity.      Cervical back: Normal range of motion and neck supple. Skin:     Findings: No erythema or rash.   Neurological:      Mental Status: Alert and oriented to person, place, and time.      Sensory: No sensory deficit.   Psychiatric:         Behavior: Behavior normal.              ECG 12 Lead    Date/Time: 4/29/2024 12:55 PM  Performed by: John Aguilera MD    Authorized by: John Aguilera MD  Comparison: compared with previous ECG from 4/20/2023  Similar to previous ECG  Rhythm: sinus rhythm  Rate: normal  QRS axis: normal  Comments: Nonspecific repolarization abnormality anterior leads               DISCUSSION/SUMMARY  69-year-old female with a medical history of coronary artery calcification, hyperlipidemia, hypertension and family history of early coronary artery disease.  She presents to me in follow-up and has been doing very well.  She continues on Crestor therapy and secondary to a persistently elevated LDL was started on Repatha recently.  She  seems to be tolerating this well with significant improvement in her LDL.  Lipoprotein-a still elevated.  She has no new complaints.    1.  Coronary artery calcification/hyperlipidemia  - Continue Crestor at current dose.  Repatha was started as she has not tolerated higher doses of Crestor in the past  -Repeat LDL showed significant improvement.  Lipoprotein-a still elevated.  -Standard treadmill stress test 2021 reviewed.  No ischemia.  No recent issues with chest pain.    2.  Essential hypertension: Very well controlled.  Continue lisinopril-HCTZ therapy at current dose.  She is tolerating this well with no hyperkalemia    I will see her back in 1 year or earlier with problems.

## 2025-03-31 ENCOUNTER — TREATMENT (OUTPATIENT)
Dept: PHYSICAL THERAPY | Facility: CLINIC | Age: 70
End: 2025-03-31
Payer: MEDICARE

## 2025-03-31 DIAGNOSIS — M53.84 DECREASED ROM OF THORACIC SPINE: ICD-10-CM

## 2025-03-31 DIAGNOSIS — G89.29 CHRONIC BILATERAL LOW BACK PAIN WITHOUT SCIATICA: Primary | ICD-10-CM

## 2025-03-31 DIAGNOSIS — M53.86 DECREASED RANGE OF MOTION OF LUMBAR SPINE: ICD-10-CM

## 2025-03-31 DIAGNOSIS — R29.898 DECREASED STRENGTH OF LOWER EXTREMITY: ICD-10-CM

## 2025-03-31 DIAGNOSIS — M54.50 CHRONIC BILATERAL LOW BACK PAIN WITHOUT SCIATICA: Primary | ICD-10-CM

## 2025-03-31 PROCEDURE — 97535 SELF CARE MNGMENT TRAINING: CPT

## 2025-03-31 PROCEDURE — 97110 THERAPEUTIC EXERCISES: CPT

## 2025-03-31 PROCEDURE — 97161 PT EVAL LOW COMPLEX 20 MIN: CPT

## 2025-03-31 NOTE — PROGRESS NOTES
Physical Therapy Initial Evaluation and Plan of Care  [facility] [address], [address]  [place] [address]     Patient: Carrie Soto   : 1955  Referring practitioner: Pallares, Clara Ann, MD  Date of Initial Visit: 3/31/2025  Today's Date: 3/31/2025  Patient seen for 1 sessions  PT Clinic location: [address], [address]  [id number]          Visit Diagnoses:    ICD-10-CM ICD-9-CM   1. Chronic bilateral low back pain without sciatica  M54.50 724.2    G89.29 338.29   2. Decreased range of motion of lumbar spine  M53.86 724.9   3. Decreased ROM of thoracic spine  M53.84 724.9   4. Decreased strength of lower extremity  R29.898 729.89       Subjective Questionnaire: Back Index: 20/50 (40% disability)    Subjective Evaluation    History of Present Illness  Mechanism of injury: I have been having back pain and radiating pain in both legs since last August. I had been walking 4 miles a day but had to cut this back some. Walking was making the pain worse but now I am walking 2-3 miles a day and this does not increase the pain.   Sleeping is more difficult now. It seems like the pain is worse when laying down. Sitting sometimes increases the pain depending on the chair. I don't really have numbness or tingling but I do have pain down the leg upon palpation. The pain does wake me up at night.   I had an MRI on  and it shows some retrolisthesis and I do have an appointment next week with a spine surgeon.    I also have some upper back pain that I can stretch out but it is in the upper shoulder blade and radiates down the shoulder blade and some in to my elbow.   The pain is mostly on my left side, periodically the right side will bother me with riding in a car or driving.   I have been taking some ibuprofen before bed and I am able to sleep a little longer before waking up.       Patient Occupation: Retired  Pain  Current pain ratin  At best pain ratin  At worst pain ratin  Quality: radiating, sharp and burning  Aggravating factors: lifting, sleeping, standing, squatting, ambulation, overhead activity and prolonged positioning    Social Support  Lives in: multiple-level home    Diagnostic Tests  MRI studies: abnormal    Treatments  Previous treatment: physical therapy (for plantar fascitis)  Patient Goals  Patient goals for therapy: decreased pain, increased motion, return to sport/leisure activities, independence with ADLs/IADLs and increased strength  Patient goal: I want to be able to sleep through the night and be able to walk better with less pain. I want to increase my endurance with yard work and bending forward.       Medical history: Hypertension, coronary artery calcification, high cholesterol, thyroid deficiency. See chart for further detail.   Therapy Precautions: N/A      Objective          Palpation   Left   Tenderness of the erector spinae, gluteus kevin, gluteus medius, lateral gastrocnemius, lumbar interspinals, lumbar paraspinals, medial gastrocnemius, piriformis, vastus lateralis and vastus medialis.     Right Tenderness of the erector spinae, gluteus kevin, gluteus medius, lateral gastrocnemius, lumbar interspinals, lumbar paraspinals, medial gastrocnemius, piriformis, vastus lateralis and vastus medialis.     Cervical Spine Comments  Left erector spinae: hypertonicity.     Tenderness     Lumbar Spine  Tenderness in the spinous process, left transverse process and right transverse process.     Left Hip   Tenderness in the greater trochanter and iliac crest.     Right Hip   Tenderness in the greater trochanter and iliac crest.   Left Knee   Tenderness in the ITB.     Right Knee   Tenderness in the ITB.     Additional Tenderness Details  Decrease mobility in both thoracic and lumbar region     Active Range of Motion   Left Hip   External rotation (90/90): 25 degrees   Internal rotation  (90/90): 40 degrees     Right Hip   External rotation (90/90): 40 degrees   Internal rotation (90/90): 30 degrees     Additional Active Range of Motion Details  Lumbar flexion: about 50% of expected ROM, increased pain in the LE and lumbar region   Lumbar extension: about 25% expected ROM, sharp pain in the lumbar region and down in to the hamstrings  Lumbar rotation: increased pain rotating to the right     Strength/Myotome Testing     Left Hip   Planes of Motion   Flexion: 4 (pain)  Extension: 4-  Abduction: 4 (pain)  External rotation: 4 (pain)  Internal rotation: 4    Right Hip   Planes of Motion   Flexion: 4  Extension: 4-  Abduction: 4-  External rotation: 4  Internal rotation: 4    Left Knee   Flexion: 4 (pain in hamstrings)  Extension: 4+    Right Knee   Flexion: 4+  Extension: 4+    Tests       Thoracic   Positive slump.     Lumbar     Left   Positive quadrant.     Right   Positive quadrant.     Left Hip   Positive JAMIE and scour.   Negative FADIR.   SLR: Positive.     Right Hip   Positive JAMIE.   Negative FADIR and scour.   SLR: Negative.     Additional Tests Details  Pain with left hip flexion          Assessment & Plan       Assessment  Impairments: abnormal or restricted ROM, activity intolerance, impaired physical strength, lacks appropriate home exercise program, pain with function and safety issue   Functional limitations: carrying objects, lifting, sleeping, walking, uncomfortable because of pain, moving in bed, sitting, standing, stooping and unable to perform repetitive tasks   Assessment details: Pt is a 69 year old female who presents to PT with symptoms consistent with nonspecific lumbar and thoracic pain along with greater trochanteric bursitis. Upon initial evaluation she presents with the following deficits and impairments: decreased lumbar ROM, decreased thoracic ROM, decreased hip ROM, decreased LE strength, nerve irritability, and postural deficits. These deficits make it difficult for  the patient to complete ADLs that involve prolonged sitting or standing, sleeping, laying down, driving or riding, and walking. Pt would benefit from skilled PT intervention to address the deficits noted and to improve overall quality of life.     Prognosis: good    Goals  Plan Goals: SHORT TERM GOALS: Time for Goal Achievement: 6 weeks  1.  Patient to be compliant and progression of HEP                             2.  Pain level < 3/10 at worst with mentioned activities to improve function  3.  Increased thoracic, lumbar and hip mobility to allow for increased lumbar AROM with less pain.  4.  Increased lumbar AROM to by 25% in all planes to allow for increased ease with sit-stand transfers and functional activities  5. Patient will report seeing at least a 50% improvement since beginning OPT.     LONG TERM GOALS: Time for Goal Achievement: 12 weeks  1.  Pt. to score < 20 % on Back Index  2.  Pain level < 2/10 with all listed activities to return to normal.  3.  Lumbar AROM to WFL to allow for return to household & recreational activities w/o increased symptoms  4.  (B) LE and lower abdominal strength to 4+/5 to allow for pushing, pulling and activities to occur without pain (driving, sitting, household requirements, and recreational activities)  5. Patient to report seeing at least an 80% improvement since beginning OPT.         Plan  Therapy options: will be seen for skilled therapy services  Planned modality interventions: cryotherapy, electrical stimulation/Russian stimulation, TENS, thermotherapy (hydrocollator packs), ultrasound and dry needling  Other planned modality interventions: Cupping  Planned therapy interventions: body mechanics training, flexibility, functional ROM exercises, home exercise program, manual therapy, neuromuscular re-education, postural training, spinal/joint mobilization, stretching, strengthening, soft tissue mobilization, therapeutic activities, motor coordination training and joint  mobilization  Frequency: 2x week  Duration in weeks: 12  Treatment plan discussed with: patient        See flowsheets for treatment detail.  Education: Discussed underlying deficits, HEP, and POC.     History # of Personal Factors and/or Comorbidities: LOW (0)  Examination of Body System(s): # of elements: LOW (1-2)  Clinical Presentation: STABLE   Clinical Decision Making: LOW       Timed:         Manual Therapy:    -     mins  03526;     Therapeutic Exercise:    16     mins  86169;     Neuromuscular Mary:    -    mins  24693;    Therapeutic Activity:     -     mins  84828;     Gait Training:           mins  76667;     Ultrasound:          mins  10629;    Ionto                                   mins   70352  Self Care                       8     mins   28222      Un-Timed:  Electrical Stimulation:         mins  21266 ( );  Dry Needling          mins self-pay  Traction          mins 25492  Low Eval     30     Mins  99585  Mod Eval     -     Mins  61247  High Eval                       -     Mins  93091  Re-Eval                               mins  35363      Timed Treatment:   24   mins   Total Treatment:     54   mins    PT SIGNATURE: Deena Harrington PT   [place] PT license #: [id number]  DATE TREATMENT INITIATED: 3/31/2025    Initial Certification  Certification Period: 6/28/2025  I certify that the therapy services are furnished while this patient is under my care.  The services outlined above are required by this patient, and will be reviewed every 90 days.    PHYSICIAN: Pallares, Clara Ann, MD  NPI: [id number]                                      DATE:     Please sign and return via fax to [facility] - Fax #: [phone]. Thank you, [facility].

## 2025-04-02 ENCOUNTER — TREATMENT (OUTPATIENT)
Dept: PHYSICAL THERAPY | Facility: CLINIC | Age: 70
End: 2025-04-02
Payer: MEDICARE

## 2025-04-02 DIAGNOSIS — M53.84 DECREASED ROM OF THORACIC SPINE: ICD-10-CM

## 2025-04-02 DIAGNOSIS — M53.86 DECREASED RANGE OF MOTION OF LUMBAR SPINE: ICD-10-CM

## 2025-04-02 DIAGNOSIS — R29.898 DECREASED STRENGTH OF LOWER EXTREMITY: ICD-10-CM

## 2025-04-02 DIAGNOSIS — G89.29 CHRONIC BILATERAL LOW BACK PAIN WITHOUT SCIATICA: Primary | ICD-10-CM

## 2025-04-02 DIAGNOSIS — M54.50 CHRONIC BILATERAL LOW BACK PAIN WITHOUT SCIATICA: Primary | ICD-10-CM

## 2025-04-02 PROCEDURE — 97110 THERAPEUTIC EXERCISES: CPT

## 2025-04-02 PROCEDURE — 97140 MANUAL THERAPY 1/> REGIONS: CPT

## 2025-04-02 PROCEDURE — 97112 NEUROMUSCULAR REEDUCATION: CPT

## 2025-04-02 NOTE — PROGRESS NOTES
Physical Therapy Daily Treatment Note  [facility] Physical Therapy [place]  [address], [address]  [place] [address]     Patient: Carrie Soto  : 1955  Referring practitioner: Pallares, Clara Ann, MD  Today's Date: 2025    VISIT#: 2    Visit Diagnoses:    ICD-10-CM ICD-9-CM   1. Chronic bilateral low back pain without sciatica  M54.50 724.2    G89.29 338.29   2. Decreased range of motion of lumbar spine  M53.86 724.9   3. Decreased ROM of thoracic spine  M53.84 724.9   4. Decreased strength of lower extremity  R29.898 729.89       Subjective   Carrie Soto reports: that she woke up with some burning in the left leg last night, rolled over to her right leg and laid there and her right LE started to burn as well. Open books are feeling pretty good.       Objective       See Exercise, Manual, and Modality Logs for complete treatment.     Patient Education: HEP review  Exercise rationale/ pain free exercise performance  Alternate exercise positions  Verbal/Tactile cues to ensure correct exercise performance/technique       Assessment/Plan  Patient demonstrates good tolerance to continued and new therapeutic exercises on this date with no report of increased lumbar or thoracic pain during or at the end of the session. Focused on LE strength and cervical and thoracic mobility. Introduced STM with the the tiger tail. Verbal cues were provided throughout for upright posture. Will continue to progress as tolerated.       Progress per Plan of Care and Progress strengthening /stabilization /functional activity          Timed:         Manual Therapy:    8     mins  29063;     Therapeutic Exercise:    35     mins  13237;     Neuromuscular Mary:    12    mins  00362;    Therapeutic Activity:     -     mins  98719;     Gait Training:           mins  88960;     Ultrasound:          mins  41833;    Ionto:                                   mins  05511  Self Care:                       -      mins  78313    Un-Timed:  Electrical Stimulation:         mins  54098 ( );  Dry Needling          mins self-pay  Traction          mins 75928  Re-Eval                               mins  06295  Group Therapy           ___ mins 32021    Timed Treatment:   55   mins   Total Treatment:     59   mins    Deena Harrington PT  Physical Therapist  [facility] [place] license #: [id number]

## 2025-04-07 ENCOUNTER — TREATMENT (OUTPATIENT)
Dept: PHYSICAL THERAPY | Facility: CLINIC | Age: 70
End: 2025-04-07
Payer: MEDICARE

## 2025-04-07 DIAGNOSIS — M53.84 DECREASED ROM OF THORACIC SPINE: ICD-10-CM

## 2025-04-07 DIAGNOSIS — G89.29 CHRONIC BILATERAL LOW BACK PAIN WITHOUT SCIATICA: Primary | ICD-10-CM

## 2025-04-07 DIAGNOSIS — M53.86 DECREASED RANGE OF MOTION OF LUMBAR SPINE: ICD-10-CM

## 2025-04-07 DIAGNOSIS — M54.50 CHRONIC BILATERAL LOW BACK PAIN WITHOUT SCIATICA: Primary | ICD-10-CM

## 2025-04-07 DIAGNOSIS — R29.898 DECREASED STRENGTH OF LOWER EXTREMITY: ICD-10-CM

## 2025-04-07 PROCEDURE — 97112 NEUROMUSCULAR REEDUCATION: CPT

## 2025-04-07 PROCEDURE — 97110 THERAPEUTIC EXERCISES: CPT

## 2025-04-07 NOTE — PROGRESS NOTES
Physical Therapy Daily Treatment Note    [facility] Physical Therapy [place]  [address], [address]  [place] [id number]    Visit # 3        Patient: Carrie Soto   : 1955  Referring practitioner: Clara Ann Pallares, MD  Date of Initial Evaluation:  Type: THERAPY  Noted: 3/31/2025  Today's Date: 2025           ICD-10-CM ICD-9-CM   1. Chronic bilateral low back pain without sciatica  M54.50 724.2    G89.29 338.29   2. Decreased range of motion of lumbar spine  M53.86 724.9   3. Decreased ROM of thoracic spine  M53.84 724.9   4. Decreased strength of lower extremity  R29.898 729.89       Subjective  Carrie Soto reports:   Had water in her basement from the recent rainstorms, spent yesterday bailing the sump well and carrying buckets of water up her basement stairs.  Feeling sore across her lower back and in (L) shoulder today, but does feel the PT exercise the prior week helped prepare her for some of that activity.     Objective   See Exercise, Manual, and Modality Logs for complete treatment     Pt Education:  HEP review  Exercise rationale/ pain free exercise performance  Anatomy and structure of affected musculature  Posture/Postural awareness  Lumbar support  Alternate exercise positions  Verbal/Tactile cues to ensure correct exercise performance/technique      Assessment/Plan  Tolerated continued progression of therapeutic exercise/therapeutic activity/neuromuscular re-ed well today, no increased pain reported during or after session.      Did require some review and cueing of previously issued HEP for proper performance. Pt demonstrates improved performance of HEP after review and practice.     Would continue to benefit from skilled PT progressing with ROM / flexibility training, with progression toward functional WB as tolerated.     Progress per Plan of Care             Timed:         Manual Therapy:         mins  71724     Therapeutic Exercise:     20    mins   99278     Neuromuscular Mary:    10    mins  11905    Therapeutic Activity:          mins  81337     Gait Training:           mins  65963     Ultrasound:          mins  98752    Ionto                                   mins  46708  Self Care                            mins  30896    Un-Timed:  Electrical Stimulation:         mins 04979 ( )  Traction          mins 91260    Timed Treatment:   30   mins   Total Treatment:     40   mins       ROBBIE Camara License #[id number]  Physical Therapist Assistant

## 2025-04-09 ENCOUNTER — TREATMENT (OUTPATIENT)
Dept: PHYSICAL THERAPY | Facility: CLINIC | Age: 70
End: 2025-04-09
Payer: MEDICARE

## 2025-04-09 DIAGNOSIS — M53.84 DECREASED ROM OF THORACIC SPINE: ICD-10-CM

## 2025-04-09 DIAGNOSIS — M54.50 CHRONIC BILATERAL LOW BACK PAIN WITHOUT SCIATICA: Primary | ICD-10-CM

## 2025-04-09 DIAGNOSIS — R29.898 DECREASED STRENGTH OF LOWER EXTREMITY: ICD-10-CM

## 2025-04-09 DIAGNOSIS — M53.86 DECREASED RANGE OF MOTION OF LUMBAR SPINE: ICD-10-CM

## 2025-04-09 DIAGNOSIS — G89.29 CHRONIC BILATERAL LOW BACK PAIN WITHOUT SCIATICA: Primary | ICD-10-CM

## 2025-04-09 PROCEDURE — 97112 NEUROMUSCULAR REEDUCATION: CPT

## 2025-04-09 PROCEDURE — 97530 THERAPEUTIC ACTIVITIES: CPT

## 2025-04-09 PROCEDURE — 97110 THERAPEUTIC EXERCISES: CPT

## 2025-04-09 NOTE — PROGRESS NOTES
Physical Therapy Daily Treatment Note  [facility] Physical Therapy [place]  [address], [address]  [place] [address]     Patient: Carrie Soto  : 1955  Referring practitioner: Pallares, Clara Ann, MD  Today's Date: 2025    VISIT#: 4    Visit Diagnoses:    ICD-10-CM ICD-9-CM   1. Chronic bilateral low back pain without sciatica  M54.50 724.2    G89.29 338.29   2. Decreased range of motion of lumbar spine  M53.86 724.9   3. Decreased ROM of thoracic spine  M53.84 724.9   4. Decreased strength of lower extremity  R29.898 729.89       Subjective   Carrie Soto reports: that she is a little sore on her left lumbar region today, after cleaning her basement she experienced bilateral lumbar pain. The pain in the lateral leg has decreased and so has the burning in the scapular region. She saw the spine specialist yesterday and he said to continue PT.      Objective       See Exercise, Manual, and Modality Logs for complete treatment.     Patient Education: HEP review  Exercise rationale/ pain free exercise performance  Alternate exercise positions  Verbal/Tactile cues to ensure correct exercise performance/technique       Assessment/Plan  Patient demonstrates good tolerance to continued and new therapeutic exercises on this date with no report of increased pain in the lumbar or thoracic regions. Introduced more exercises targeting the thoracic mobility and continued with lumbar mobility and LE strengthening. Patient is progressing well and will continue to progress as tolerated. Discussed scheduling out through April when we will perform a progress note.       Progress per Plan of Care and Progress strengthening /stabilization /functional activity          Timed:         Manual Therapy:    -     mins  53300;     Therapeutic Exercise:    15     mins  60410;     Neuromuscular Mary:    10    mins  71998;    Therapeutic Activity:     10     mins  71499;     Gait Training:            mins  28239;     Ultrasound:          mins  33071;    Ionto:                                   mins  56103  Self Care:                       5     mins  89773    Un-Timed:  Electrical Stimulation:         mins  81550 ( );  Dry Needling          mins self-pay  Traction          mins 23422  Re-Eval                               mins  74448  Group Therapy           ___ mins 25891    Timed Treatment:   40   mins   Total Treatment:     57   mins    Deena Harrington PT  Physical Therapist  [facility] [place] license #: [id number]

## 2025-04-14 ENCOUNTER — TREATMENT (OUTPATIENT)
Dept: PHYSICAL THERAPY | Facility: CLINIC | Age: 70
End: 2025-04-14
Payer: MEDICARE

## 2025-04-14 DIAGNOSIS — M53.84 DECREASED ROM OF THORACIC SPINE: ICD-10-CM

## 2025-04-14 DIAGNOSIS — M54.50 CHRONIC BILATERAL LOW BACK PAIN WITHOUT SCIATICA: Primary | ICD-10-CM

## 2025-04-14 DIAGNOSIS — R29.898 DECREASED STRENGTH OF LOWER EXTREMITY: ICD-10-CM

## 2025-04-14 DIAGNOSIS — M53.86 DECREASED RANGE OF MOTION OF LUMBAR SPINE: ICD-10-CM

## 2025-04-14 DIAGNOSIS — G89.29 CHRONIC BILATERAL LOW BACK PAIN WITHOUT SCIATICA: Primary | ICD-10-CM

## 2025-04-14 PROCEDURE — 97110 THERAPEUTIC EXERCISES: CPT

## 2025-04-14 PROCEDURE — 97112 NEUROMUSCULAR REEDUCATION: CPT

## 2025-04-14 NOTE — PROGRESS NOTES
Physical Therapy Daily Treatment Note  [facility] Physical Therapy [place]  [address], [address]  [place] [address]     Patient: Carrie Soto  : 1955  Referring practitioner: Pallares, Clara Ann, MD  Today's Date: 2025    VISIT#: 5    Visit Diagnoses:    ICD-10-CM ICD-9-CM   1. Chronic bilateral low back pain without sciatica  M54.50 724.2    G89.29 338.29   2. Decreased range of motion of lumbar spine  M53.86 724.9   3. Decreased ROM of thoracic spine  M53.84 724.9   4. Decreased strength of lower extremity  R29.898 729.89       Subjective   Carrie Soto reports: that her up er back and shoulder region has really been bothering her this week. The hip has been about the same, she still feels it everyday.       Objective       See Exercise, Manual, and Modality Logs for complete treatment.     Patient Education: HEP review  Exercise rationale/ pain free exercise performance  Alternate exercise positions  Verbal/Tactile cues to ensure correct exercise performance/technique       Assessment/Plan  Patient demonstrates good tolerance to continued therapeutic exercises on this date with no report of increased pain during or at the end of the session. Continued with thoracic mobility and targeting LE strength. Progressed hooklying hip abduction/ER to side lying clamshells. Will continue to progress as tolerated.      Progress per Plan of Care and Progress strengthening /stabilization /functional activity          Timed:         Manual Therapy:    -     mins  42654;     Therapeutic Exercise:    26     mins  02151;     Neuromuscular Mary:    15    mins  57340;    Therapeutic Activity:     -     mins  91981;     Gait Training:           mins  49568;     Ultrasound:          mins  73471;    Ionto:                                   mins  90709  Self Care:                       -     mins  50493    Un-Timed:  Electrical Stimulation:         mins  90971 ( );  Dry Needling           mins self-pay  Traction          mins 56220  Re-Eval                               mins  13877  Group Therapy           ___ mins 07261    Timed Treatment:   41   mins   Total Treatment:     53   mins    Deena Harrington PT  Physical Therapist  [place] license #: [id number]

## 2025-04-16 ENCOUNTER — TREATMENT (OUTPATIENT)
Dept: PHYSICAL THERAPY | Facility: CLINIC | Age: 70
End: 2025-04-16
Payer: MEDICARE

## 2025-04-16 DIAGNOSIS — M53.84 DECREASED ROM OF THORACIC SPINE: ICD-10-CM

## 2025-04-16 DIAGNOSIS — R29.898 DECREASED STRENGTH OF LOWER EXTREMITY: ICD-10-CM

## 2025-04-16 DIAGNOSIS — M53.86 DECREASED RANGE OF MOTION OF LUMBAR SPINE: ICD-10-CM

## 2025-04-16 DIAGNOSIS — M54.50 CHRONIC BILATERAL LOW BACK PAIN WITHOUT SCIATICA: Primary | ICD-10-CM

## 2025-04-16 DIAGNOSIS — G89.29 CHRONIC BILATERAL LOW BACK PAIN WITHOUT SCIATICA: Primary | ICD-10-CM

## 2025-04-16 PROCEDURE — 97110 THERAPEUTIC EXERCISES: CPT

## 2025-04-16 PROCEDURE — 97140 MANUAL THERAPY 1/> REGIONS: CPT

## 2025-04-16 NOTE — PROGRESS NOTES
Physical Therapy Daily Treatment Note  [facility] Physical Therapy [place]  [address], [address]  [place] [id number]     Patient: Carrie Soto  : 1955  Referring practitioner: Pallares, Clara Ann, MD  Today's Date: 2025    VISIT#: 6    Visit Diagnoses:    ICD-10-CM ICD-9-CM   1. Chronic bilateral low back pain without sciatica  M54.50 724.2    G89.29 338.29   2. Decreased range of motion of lumbar spine  M53.86 724.9   3. Decreased ROM of thoracic spine  M53.84 724.9   4. Decreased strength of lower extremity  R29.898 729.89       Subjective   Carrie Soto reports: that the hip is showing good improvement, decreased pain. She continues to have pain in the upper back and scapular region.      Objective       See Exercise, Manual, and Modality Logs for complete treatment.     Patient Education: HEP review  Exercise rationale/ pain free exercise performance  Alternate exercise positions  Verbal/Tactile cues to ensure correct exercise performance/technique       Assessment/Plan  Patient demonstrates good tolerance to continued and new therapeutic interventions. Continued with IASTM with good tolerance. Introduced more thoracic and upper lumbar spine mobility exercises. She is progressing well and will continue to benefit from skilled therapeutic interventions. Will continue to progress as tolerated.      Progress per Plan of Care and Progress strengthening /stabilization /functional activity          Timed:         Manual Therapy:    8     mins  28391;     Therapeutic Exercise:    22     mins  28686;     Neuromuscular Mary:    -    mins  97019;    Therapeutic Activity:     -     mins  83061;     Gait Training:           mins  89169;     Ultrasound:          mins  32301;    Ionto:                                   mins  57638  Self Care:                       -     mins  17012    Un-Timed:  Electrical Stimulation:         mins  64946 ( );  Dry Needling          mins  self-pay  Traction          mins 08880  Re-Eval                               mins  74009  Group Therapy           ___ mins 76154    Timed Treatment:   30   mins   Total Treatment:     57   mins    Deena Harrington PT  Physical Therapist  [place] license #: [id number]

## 2025-04-21 ENCOUNTER — TREATMENT (OUTPATIENT)
Dept: PHYSICAL THERAPY | Facility: CLINIC | Age: 70
End: 2025-04-21
Payer: MEDICARE

## 2025-04-21 DIAGNOSIS — M53.86 DECREASED RANGE OF MOTION OF LUMBAR SPINE: ICD-10-CM

## 2025-04-21 DIAGNOSIS — R29.898 DECREASED STRENGTH OF LOWER EXTREMITY: ICD-10-CM

## 2025-04-21 DIAGNOSIS — M54.50 CHRONIC BILATERAL LOW BACK PAIN WITHOUT SCIATICA: Primary | ICD-10-CM

## 2025-04-21 DIAGNOSIS — M53.84 DECREASED ROM OF THORACIC SPINE: ICD-10-CM

## 2025-04-21 DIAGNOSIS — G89.29 CHRONIC BILATERAL LOW BACK PAIN WITHOUT SCIATICA: Primary | ICD-10-CM

## 2025-04-21 PROCEDURE — 97110 THERAPEUTIC EXERCISES: CPT

## 2025-04-21 PROCEDURE — 97140 MANUAL THERAPY 1/> REGIONS: CPT

## 2025-04-21 NOTE — PROGRESS NOTES
Physical Therapy Daily Treatment Note  [facility] Physical Therapy [place]  [address], [address]  [place] [address]     Patient: Carrie Soto  : 1955  Referring practitioner: Pallares, Clara Ann, MD  Today's Date: 2025    VISIT#: 7    Visit Diagnoses:    ICD-10-CM ICD-9-CM   1. Chronic bilateral low back pain without sciatica  M54.50 724.2    G89.29 338.29   2. Decreased range of motion of lumbar spine  M53.86 724.9   3. Decreased ROM of thoracic spine  M53.84 724.9   4. Decreased strength of lower extremity  R29.898 729.89         Subjective   Carrie Soto reports:  Hip is achy and sore today, but probably d/t Easter Day activities yesterday.  Overall feels some improvements, some days doesn't feel much at all in the hip.      Objective   See Exercise, Manual, and Modality Logs for complete treatment.     Patient Education: HEP review - issued YTB and GTB for 3-way hip exercise   Exercise rationale/ pain free exercise performance  Alternate exercise positions  Verbal/Tactile cues to ensure correct exercise performance/technique       Assessment/Plan  Patient demonstrates good tolerance to continued and new therapeutic interventions. Continued with IASTM with both tiger tail and metal IASTM tool good tolerance, most affected area noted to be distal ITB and (R) ischial tuberosity area.  Found band resisted exercises challenging, gave pt YTB and GTB for HEP progression.    Will continue to progress as tolerated.      Progress per Plan of Care and Progress strengthening /stabilization /functional activity          Timed:         Manual Therapy:    8     mins  01297;     Therapeutic Exercise:    22     mins  50676;     Neuromuscular Mary:    -    mins  60688;    Therapeutic Activity:     -     mins  70035;     Gait Training:           mins  94005;     Ultrasound:          mins  33581;    Ionto:                                   mins  69644  Self Care:                        -     mins  20801    Un-Timed:  Electrical Stimulation:         mins  30545 ( );  Dry Needling          mins self-pay  Traction          mins 19276  Re-Eval                               mins  97030  Group Therapy           ___ mins 98906    Timed Treatment:   30   mins   Total Treatment:     48   mins    ROBBIE Camara License #[id number]  Physical Therapist Assistant

## 2025-04-23 ENCOUNTER — TREATMENT (OUTPATIENT)
Dept: PHYSICAL THERAPY | Facility: CLINIC | Age: 70
End: 2025-04-23
Payer: MEDICARE

## 2025-04-23 DIAGNOSIS — M53.86 DECREASED RANGE OF MOTION OF LUMBAR SPINE: ICD-10-CM

## 2025-04-23 DIAGNOSIS — R29.898 DECREASED STRENGTH OF LOWER EXTREMITY: ICD-10-CM

## 2025-04-23 DIAGNOSIS — M54.50 CHRONIC BILATERAL LOW BACK PAIN WITHOUT SCIATICA: Primary | ICD-10-CM

## 2025-04-23 DIAGNOSIS — M53.84 DECREASED ROM OF THORACIC SPINE: ICD-10-CM

## 2025-04-23 DIAGNOSIS — G89.29 CHRONIC BILATERAL LOW BACK PAIN WITHOUT SCIATICA: Primary | ICD-10-CM

## 2025-04-23 PROCEDURE — 97112 NEUROMUSCULAR REEDUCATION: CPT

## 2025-04-23 PROCEDURE — 97110 THERAPEUTIC EXERCISES: CPT

## 2025-04-23 NOTE — PROGRESS NOTES
Physical Therapy Daily Treatment Note  [facility] Physical Therapy [place]  [address], [address]  [place] [address]     Patient: Carrie Soto  : 1955  Referring practitioner: Pallares, Clara Ann, MD  Today's Date: 2025    VISIT#: 8    Visit Diagnoses:    ICD-10-CM ICD-9-CM   1. Chronic bilateral low back pain without sciatica  M54.50 724.2    G89.29 338.29   2. Decreased range of motion of lumbar spine  M53.86 724.9   3. Decreased ROM of thoracic spine  M53.84 724.9   4. Decreased strength of lower extremity  R29.898 729.89       Subjective   Carrie Soto reports: that she was a little sore after last visit with the new exercises but nothing terrible. She has been waking up with some lower back soreness that subsides when she stands up or does some gentle mobility exercises. She is going to cut grass today to test the upper thoracic pain.       Objective       See Exercise, Manual, and Modality Logs for complete treatment.     Patient Education: HEP review  Exercise rationale/ pain free exercise performance  Alternate exercise positions  Verbal/Tactile cues to ensure correct exercise performance/technique       Assessment/Plan  Patient demonstrates good tolerance to continued therapeutic exercises on this date with no report of increased pain in any region. Continued with thoracic mobility and LE strength, continued with progressed resistance form last visit. Discussed trying to go for a walk to test the thoracic pain. Patient is progressing well and will continue to progress as tolerated.      Progress per Plan of Care and Progress strengthening /stabilization /functional activity          Timed:         Manual Therapy:    -     mins  41293;     Therapeutic Exercise:    10     mins  54782;     Neuromuscular Mary:    10    mins  56287;    Therapeutic Activity:     10     mins  06386;     Gait Training:           mins  83062;     Ultrasound:          mins  54642;     Ionto:                                   mins  25686  Self Care:                       -     mins  89169    Un-Timed:  Electrical Stimulation:         mins  35385 ( );  Dry Needling          mins self-pay  Traction          mins 05488  Re-Eval                               mins  62943  Group Therapy           ___ mins 67060    Timed Treatment:   30   mins   Total Treatment:     57   mins    Deena Harrington PT  Physical Therapist  [place] license #: [id number]

## 2025-04-28 ENCOUNTER — TREATMENT (OUTPATIENT)
Dept: PHYSICAL THERAPY | Facility: CLINIC | Age: 70
End: 2025-04-28
Payer: MEDICARE

## 2025-04-28 DIAGNOSIS — R29.898 DECREASED STRENGTH OF LOWER EXTREMITY: ICD-10-CM

## 2025-04-28 DIAGNOSIS — G89.29 CHRONIC BILATERAL LOW BACK PAIN WITHOUT SCIATICA: Primary | ICD-10-CM

## 2025-04-28 DIAGNOSIS — M53.84 DECREASED ROM OF THORACIC SPINE: ICD-10-CM

## 2025-04-28 DIAGNOSIS — M53.86 DECREASED RANGE OF MOTION OF LUMBAR SPINE: ICD-10-CM

## 2025-04-28 DIAGNOSIS — M54.50 CHRONIC BILATERAL LOW BACK PAIN WITHOUT SCIATICA: Primary | ICD-10-CM

## 2025-04-28 PROCEDURE — 97112 NEUROMUSCULAR REEDUCATION: CPT

## 2025-04-28 PROCEDURE — 97110 THERAPEUTIC EXERCISES: CPT

## 2025-04-28 NOTE — PROGRESS NOTES
Physical Therapy Daily Treatment Note  [facility] Physical Therapy [place]  [address]  [place] [address]     Patient: Carrie Soto  : 1955  Referring practitioner: Pallares, Clara Ann, MD  Today's Date: 2025    VISIT#: 9    Visit Diagnoses:    ICD-10-CM ICD-9-CM   1. Chronic bilateral low back pain without sciatica  M54.50 724.2    G89.29 338.29   2. Decreased range of motion of lumbar spine  M53.86 724.9   3. Decreased ROM of thoracic spine  M53.84 724.9   4. Decreased strength of lower extremity  R29.898 729.89       Subjective   Carrie Soto reports: that she cut grass on Wednesday and still had some pain in the upper thoracic region. She has been walking more as well. She reports if she does the exercises before walking she doesn't have any pain, however, after the walking she gets some lower back and hip pain.       Objective       See Exercise, Manual, and Modality Logs for complete treatment.     Patient Education: HEP review  Exercise rationale/ pain free exercise performance  Alternate exercise positions  Verbal/Tactile cues to ensure correct exercise performance/technique       Assessment/Plan  Patient demonstrates good tolerance to continued therapeutic exercises on this date with no report of increased pain during or at the end of the session. Continued with lumbar mobility and LE strength. Introduced a lat stretch with report of targeting the lower back pain. Progressed standing hip flexion, abduction, and extension with resistance. Will continue to progress as tolerated.      Progress per Plan of Care and Progress strengthening /stabilization /functional activity          Timed:         Manual Therapy:    -     mins  32318;     Therapeutic Exercise:    10     mins  62910;     Neuromuscular Mary:    10    mins  10316;    Therapeutic Activity:     10     mins  96349;     Gait Training:           mins  62465;     Ultrasound:          mins  95280;     Ionto:                                   mins  17488  Self Care:                       -     mins  48778    Un-Timed:  Electrical Stimulation:         mins  61664 ( );  Dry Needling          mins self-pay  Traction          mins 66861  Re-Eval                               mins  50136  Group Therapy           ___ mins 95168    Timed Treatment:   30   mins   Total Treatment:     55   mins    Deena Harrington PT  Physical Therapist  [place] license #: [id number]

## 2025-04-30 ENCOUNTER — TREATMENT (OUTPATIENT)
Dept: PHYSICAL THERAPY | Facility: CLINIC | Age: 70
End: 2025-04-30
Payer: MEDICARE

## 2025-04-30 DIAGNOSIS — M54.50 CHRONIC BILATERAL LOW BACK PAIN WITHOUT SCIATICA: Primary | ICD-10-CM

## 2025-04-30 DIAGNOSIS — M53.84 DECREASED ROM OF THORACIC SPINE: ICD-10-CM

## 2025-04-30 DIAGNOSIS — G89.29 CHRONIC BILATERAL LOW BACK PAIN WITHOUT SCIATICA: Primary | ICD-10-CM

## 2025-04-30 DIAGNOSIS — M53.86 DECREASED RANGE OF MOTION OF LUMBAR SPINE: ICD-10-CM

## 2025-04-30 DIAGNOSIS — R29.898 DECREASED STRENGTH OF LOWER EXTREMITY: ICD-10-CM

## 2025-04-30 PROCEDURE — 97112 NEUROMUSCULAR REEDUCATION: CPT

## 2025-04-30 PROCEDURE — 97530 THERAPEUTIC ACTIVITIES: CPT

## 2025-04-30 NOTE — PROGRESS NOTES
Physical Therapy Daily Treatment Note  [facility] Physical Therapy [place]  [address], [address]  [place] [address]     Patient: Carrie Soto  : 1955  Referring practitioner: Pallares, Clara Ann, MD  Today's Date: 2025    VISIT#: 10    Visit Diagnoses:    ICD-10-CM ICD-9-CM   1. Chronic bilateral low back pain without sciatica  M54.50 724.2    G89.29 338.29   2. Decreased range of motion of lumbar spine  M53.86 724.9   3. Decreased ROM of thoracic spine  M53.84 724.9   4. Decreased strength of lower extremity  R29.898 729.89       Subjective   Carrie Soto reports: that she had a rough night, she went and got a massage yesterday and felt good but at night she woke up and everything felt really tight. When she stands up she feels less pain but laying down she feels it more.       Objective       See Exercise, Manual, and Modality Logs for complete treatment.     Patient Education: HEP review  Exercise rationale/ pain free exercise performance  Alternate exercise positions  Verbal/Tactile cues to ensure correct exercise performance/technique       Assessment/Plan  Patient demonstrates good tolerance to continued and progressed therapeutic exercises and activities on this date with no report of increased pain during or at the end of the session. Continued with LE strength, mobility, and muscle extensibility exercises. Verbal cues were provided throughout for proper form and technique and good posture with cervical stretching. Will continue to progress as tolerated.       Progress per Plan of Care and Progress strengthening /stabilization /functional activity          Timed:         Manual Therapy:    -     mins  19058;     Therapeutic Exercise:    -     mins  55395;     Neuromuscular Mary:    15    mins  57244;    Therapeutic Activity:     15     mins  72160;     Gait Training:           mins  82696;     Ultrasound:          mins  32540;    Ionto:                                    mins  45436  Self Care:                       -     mins  54226    Un-Timed:  Electrical Stimulation:         mins  28446 ( );  Dry Needling          mins self-pay  Traction          mins 65660  Re-Eval                               mins  13002  Group Therapy           ___ mins 77430    Timed Treatment:   30   mins   Total Treatment:     58   mins    Deena Harrington PT  Physical Therapist  [place] license #: [id number]

## 2025-05-01 ENCOUNTER — LAB (OUTPATIENT)
Dept: LAB | Facility: HOSPITAL | Age: 70
End: 2025-05-01
Payer: MEDICARE

## 2025-05-01 ENCOUNTER — OFFICE VISIT (OUTPATIENT)
Age: 70
End: 2025-05-01
Payer: MEDICARE

## 2025-05-01 VITALS
SYSTOLIC BLOOD PRESSURE: 124 MMHG | HEART RATE: 73 BPM | OXYGEN SATURATION: 97 % | DIASTOLIC BLOOD PRESSURE: 80 MMHG | WEIGHT: 166 LBS | HEIGHT: 64 IN | BODY MASS INDEX: 28.34 KG/M2

## 2025-05-01 DIAGNOSIS — M79.10 MYALGIA: Primary | ICD-10-CM

## 2025-05-01 DIAGNOSIS — M79.10 MYALGIA: ICD-10-CM

## 2025-05-01 LAB — CK SERPL-CCNC: 144 U/L (ref 20–180)

## 2025-05-01 PROCEDURE — 36415 COLL VENOUS BLD VENIPUNCTURE: CPT

## 2025-05-01 PROCEDURE — 93000 ELECTROCARDIOGRAM COMPLETE: CPT | Performed by: INTERNAL MEDICINE

## 2025-05-01 PROCEDURE — 3074F SYST BP LT 130 MM HG: CPT | Performed by: INTERNAL MEDICINE

## 2025-05-01 PROCEDURE — 82550 ASSAY OF CK (CPK): CPT

## 2025-05-01 PROCEDURE — 99214 OFFICE O/P EST MOD 30 MIN: CPT | Performed by: INTERNAL MEDICINE

## 2025-05-01 PROCEDURE — 3079F DIAST BP 80-89 MM HG: CPT | Performed by: INTERNAL MEDICINE

## 2025-05-01 RX ORDER — FOLIC ACID 0.4 MG
400 TABLET ORAL DAILY
COMMUNITY

## 2025-05-01 RX ORDER — CLOBETASOL PROPIONATE 0.5 MG/G
OINTMENT TOPICAL
COMMUNITY
Start: 2025-02-26

## 2025-05-01 NOTE — PROGRESS NOTES
Carrie MAYER Charles  1955  Date of Office Visit: 05/01/25  Encounter Provider: John Aguilera MD  Place of Service: [facility] CARDIOLOGY      CHIEF COMPLAINT:  Elevated coronary calcium score  Family history of early coronary artery disease and myocardial infarction  Mixed hyperlipidemia    HISTORY OF PRESENT ILLNESS:   70 year-old female with a medical history of hyperlipidemia, hypertension and a family history of early coronary artery disease, and early myocardial infarction, who initially presented to me secondary to her family history but also elevated coronary artery calcium score.  She has had an elevated LDL and lipoprotein-a   she did have a coronary artery calcium score which I have reviewed.  Her coronary artery calcium score was measured at 309 with calcification in the LAD and RCA above 100 and a score of 56 in the circumflex.  The left main was clean.    She subsequently was sent for standard treadmill stress test in 2021 and did very well with that.  She went 9 minutes with no ischemic changes.   She has been doing well as of late.  She does complain of some left-sided muscle aches and has been working with physical therapy.  Last lipid panel showed excellent control of her LDL.  She denies any chest pain or dyspnea.    Review of Systems   Constitutional: Negative for fever and malaise/fatigue.   HENT:  Negative for nosebleeds and sore throat.    Eyes:  Negative for blurred vision and double vision.   Cardiovascular:  Negative for chest pain, claudication, palpitations and syncope.   Respiratory:  Negative for cough, shortness of breath and snoring.    Endocrine: Negative for cold intolerance, heat intolerance and polydipsia.   Skin:  Negative for itching, poor wound healing and rash.   Musculoskeletal:  Negative for joint pain, joint swelling, muscle weakness and myalgias.   Gastrointestinal:  Negative for abdominal pain, melena, nausea and vomiting.  "  Neurological:  Negative for light-headedness, loss of balance, seizures, vertigo and weakness.   Psychiatric/Behavioral:  Negative for altered mental status and depression.           Past Medical History:   Diagnosis Date    Cancer 2009    breast/  Chemo and radiation    Hyperlipidemia     Hypertension     Sleep apnea     uses c-pap machine    Thyroid disorder        The following portions of the patient's history were reviewed and updated as appropriate: Social history , Family history and Surgical history     Current Outpatient Medications on File Prior to Visit   Medication Sig Dispense Refill    Calcium Carbonate-Vitamin D (CALCIUM-D PO) Take  by mouth Daily.      Cholecalciferol (Vitamin D3) 50 MCG (2000 UT) tablet Take 3 tablets by mouth Daily.      clobetasol (TEMOVATE) 0.05 % ointment APPLY A SMALL AMOUNT TOPICALLY TWICE A DAY      Cyanocobalamin (B-12) 1000 MCG tablet Take 1 tablet by mouth Daily With Lunch.      levothyroxine (SYNTHROID, LEVOTHROID) 25 MCG tablet Take 1 tablet by mouth Daily.      lisinopril (PRINIVIL,ZESTRIL) 10 MG tablet Take 1 tablet by mouth Daily.      lisinopril-hydrochlorothiazide (PRINZIDE,ZESTORETIC) 20-12.5 MG per tablet Daily.      Repatha SureClick solution auto-injector SureClick injection Inject 1 mL under the skin into the appropriate area as directed Every 14 (Fourteen) Days.      rosuvastatin (CRESTOR) 20 MG tablet Take 1 tablet by mouth Daily.      folic acid (FOLVITE) 400 MCG tablet Take 1 tablet by mouth Daily.       No current facility-administered medications on file prior to visit.       Allergies   Allergen Reactions    Codeine Nausea Only       Vitals:    05/01/25 1345   BP: 124/80   Pulse: 73   SpO2: 97%   Weight: 75.3 kg (166 lb)   Height: 162.6 cm (64\")       Body mass index is 28.49 kg/m².   Constitutional:       Appearance: Well-developed.   Eyes:      General: No scleral icterus.     Conjunctiva/sclera: Conjunctivae normal.   HENT:      Head: Normocephalic " and atraumatic.   Neck:      Thyroid: No thyromegaly.      Vascular: Normal carotid pulses. No carotid bruit, hepatojugular reflux or JVD.      Trachea: No tracheal deviation.   Pulmonary:      Effort: No respiratory distress.      Breath sounds: Normal breath sounds. No decreased breath sounds. No wheezing. No rhonchi. No rales.   Chest:      Chest wall: Not tender to palpatation.   Cardiovascular:      Normal rate. Regular rhythm.      No gallop.    Pulses:     Carotid: 2+ bilaterally.     Radial: 2+ bilaterally.     Femoral: 2+ bilaterally.     Dorsalis pedis: 2+ bilaterally.     Posterior tibial: 2+ bilaterally.  Edema:     Peripheral edema absent.   Abdominal:      General: Bowel sounds are normal. There is no distension.      Palpations: Abdomen is soft.      Tenderness: There is no abdominal tenderness.   Musculoskeletal:         General: No deformity.      Cervical back: Normal range of motion and neck supple. Skin:     Findings: No erythema or rash.   Neurological:      Mental Status: Alert and oriented to person, place, and time.      Sensory: No sensory deficit.   Psychiatric:         Behavior: Behavior normal.              ECG 12 Lead    Date/Time: 5/1/2025 2:05 PM  Performed by: John Aguilera MD    Authorized by: John Aguilera MD  Comparison: compared with previous ECG from 4/29/2024  Similar to previous ECG  Rhythm: sinus rhythm  Rate: normal  QRS axis: right               DISCUSSION/SUMMARY  70-year-old female with a medical history of coronary artery calcification, hyperlipidemia, hypertension and family history of early coronary artery disease.  She presents to me in follow-up and has been doing very well.  She continues on Crestor therapy and Repatha and has had significant improvement in her lipid panel.  She does complain of left upper and lower extremity muscle aches but I think it is very unlikely this is from her cholesterol regimen.    1.  Coronary artery  calcification/hyperlipidemia  - Continue Crestor and Repatha.  CK ordered to double check and make sure there is no significant issues with myositis  -Standard treadmill stress test 2021 reviewed.  No ischemia.  No recent issues with chest pain.    2.  Essential hypertension: Very well controlled.  Continue lisinopril-HCTZ therapy at current dose.  She is tolerating this well with no hyperkalemia    I will see her back in 1 year or earlier with problems.

## 2025-05-05 ENCOUNTER — TREATMENT (OUTPATIENT)
Dept: PHYSICAL THERAPY | Facility: CLINIC | Age: 70
End: 2025-05-05
Payer: MEDICARE

## 2025-05-05 DIAGNOSIS — M53.84 DECREASED ROM OF THORACIC SPINE: ICD-10-CM

## 2025-05-05 DIAGNOSIS — M54.50 CHRONIC BILATERAL LOW BACK PAIN WITHOUT SCIATICA: Primary | ICD-10-CM

## 2025-05-05 DIAGNOSIS — G89.29 CHRONIC BILATERAL LOW BACK PAIN WITHOUT SCIATICA: Primary | ICD-10-CM

## 2025-05-05 DIAGNOSIS — M53.86 DECREASED RANGE OF MOTION OF LUMBAR SPINE: ICD-10-CM

## 2025-05-05 DIAGNOSIS — R29.898 DECREASED STRENGTH OF LOWER EXTREMITY: ICD-10-CM

## 2025-05-05 PROCEDURE — 97140 MANUAL THERAPY 1/> REGIONS: CPT

## 2025-05-05 PROCEDURE — 97112 NEUROMUSCULAR REEDUCATION: CPT

## 2025-05-05 PROCEDURE — 97110 THERAPEUTIC EXERCISES: CPT

## 2025-05-05 PROCEDURE — 97530 THERAPEUTIC ACTIVITIES: CPT

## 2025-05-05 NOTE — PROGRESS NOTES
Physical Therapy Daily Treatment and Progress Note  [facility] Physical Therapy [place]  [address], [address]  [place] [address]     Patient: Carrie Soto  : 1955  Referring practitioner: Pallares, Clara Ann, MD  Today's Date: 2025    VISIT#: 11    Visit Diagnoses:    ICD-10-CM ICD-9-CM   1. Chronic bilateral low back pain without sciatica  M54.50 724.2    G89.29 338.29   2. Decreased range of motion of lumbar spine  M53.86 724.9   3. Decreased ROM of thoracic spine  M53.84 724.9   4. Decreased strength of lower extremity  R29.898 729.89       Subjective Questionnaire: Back Index: 20/50 (40% disability) - 15/50 (30% disability)    Subjective Evaluation    Pain  Current pain ratin  At best pain ratin  At worst pain ratin  Quality: dull ache and sharp         Carrie Soto reports: that she has been able to sleep a little better the last couple days. She reports seeing about a 50% improvement since beginning OPT. Shoulder pain has been doing better but the lower back and posterior hip pain still comes and goes. She is able to walk better and further with less pain. She continues to be woken up with the pain which makes it difficult to get a good night sleep. As she walks more (3 mile walk) the pain continues to improve. If she is sitting or laying down and feels the pain she will get up and move around and this decreases the pain.       Objective          Tests     Lumbar     Left   Positive quadrant.     Right   Positive quadrant.     Left Hip   Positive JAMIE, FADIR and scour.       Active Range of Motion   Left Hip   External rotation (90/90): 25 degrees (35 degrees)  Internal rotation (90/90): 40 degrees (45 degrees)     Right Hip   External rotation (90/90): 40 degrees (40 degrees)  Internal rotation (90/90): 30 degrees (40 degrees)     Additional Active Range of Motion Details  Lumbar flexion: about 50% of expected ROM, increased pain in the LE and lumbar  region   Lumbar extension: about 25% expected ROM, sharp pain in the lumbar region and down in to the hamstrings  Lumbar rotation: increased pain rotating to the right      Strength/Myotome Testing      Left Hip   Planes of Motion   Flexion: 4 (pain) (4+)  Extension: 4- (4+)  Abduction: 4 (pain) (4+, pain)  External rotation: 4 (pain) (4+)  Internal rotation: 4 (4+)     Right Hip   Planes of Motion   Flexion: 4 (4+)  Extension: 4- (4+)  Abduction: 4- (4+)  External rotation: 4 (4+)  Internal rotation: 4 (4+)     Left Knee   Flexion: 4 (pain in hamstrings)  Extension: 4+     Right Knee   Flexion: 4+  Extension: 4+       See Exercise, Manual, and Modality Logs for complete treatment.     Patient Education: HEP review  Exercise rationale/ pain free exercise performance  Alternate exercise positions  Verbal/Tactile cues to ensure correct exercise performance/technique       Assessment/Plan  Patient has demonstrate good progress thus far with skilled therapeutic interventions. Subjectively she reports seeing a 50%  improvement and pain levels are better controlled. Her outcome measure score demonstrates a significant improvement, demonstrating a decrease in perceived disability. Objectively, both hip and lumbar ROM has improved along with LE strength. These improvements have made it easier for the patient to walk longer distances with less increase in pain. She continues to have difficulty with sitting for a prolonged period of time and sleeping. She has met 4/5 STGs and in progressing well towards the others. She continues to present with positive special tests for both lumbar and hip pathologies. I answered any questions that she had at the time and discussed continuing with the 2x a week as stated in the initial POC to continue to target the remaining deficits. She will greatly benefit from continued skilled therapeutic interventions.      Goals  Plan Goals: SHORT TERM GOALS: Time for Goal Achievement: 6 weeks  1.   Patient to be compliant and progression of HEP    MET                         2.  Pain level < 3/10 at worst with mentioned activities to improve function Progressing   3.  Increased thoracic, lumbar and hip mobility to allow for increased lumbar AROM with less pain. MET  4.  Increased lumbar AROM to by 25% in all planes to allow for increased ease with sit-stand transfers and functional activities MET  5. Patient will report seeing at least a 50% improvement since beginning OPT. MET     LONG TERM GOALS: Time for Goal Achievement: 12 weeks  1.  Pt. to score < 20 % on Back Index  2.  Pain level < 2/10 with all listed activities to return to normal.  3.  Lumbar AROM to WFL to allow for return to household & recreational activities w/o increased symptoms  4.  (B) LE and lower abdominal strength to 4+/5 to allow for pushing, pulling and activities to occur without pain (driving, sitting, household requirements, and recreational activities)  5. Patient to report seeing at least an 80% improvement since beginning OPT.     Progress per Plan of Care and Progress strengthening /stabilization /functional activity          Timed:         Manual Therapy:    8     mins  44592;     Therapeutic Exercise:    15     mins  50360;     Neuromuscular Mary:    15    mins  73265;    Therapeutic Activity:     10     mins  07581;     Gait Training:           mins  58486;     Ultrasound:          mins  71076;    Ionto:                                   mins  94698  Self Care:                       7     mins  94664    Un-Timed:  Electrical Stimulation:         mins  91070 ( );  Dry Needling          mins self-pay  Traction          mins 94969  Re-Eval                               mins  50602  Group Therapy           ___ mins 88347    Timed Treatment:   55   mins   Total Treatment:     64   mins    Deena Harrington PT  Physical Therapist  [facility] license #: [id number]

## 2025-05-07 ENCOUNTER — TREATMENT (OUTPATIENT)
Dept: PHYSICAL THERAPY | Facility: CLINIC | Age: 70
End: 2025-05-07
Payer: MEDICARE

## 2025-05-07 DIAGNOSIS — G89.29 CHRONIC BILATERAL LOW BACK PAIN WITHOUT SCIATICA: Primary | ICD-10-CM

## 2025-05-07 DIAGNOSIS — M53.84 DECREASED ROM OF THORACIC SPINE: ICD-10-CM

## 2025-05-07 DIAGNOSIS — R29.898 DECREASED STRENGTH OF LOWER EXTREMITY: ICD-10-CM

## 2025-05-07 DIAGNOSIS — M53.86 DECREASED RANGE OF MOTION OF LUMBAR SPINE: ICD-10-CM

## 2025-05-07 DIAGNOSIS — M54.50 CHRONIC BILATERAL LOW BACK PAIN WITHOUT SCIATICA: Primary | ICD-10-CM

## 2025-05-07 PROCEDURE — 97530 THERAPEUTIC ACTIVITIES: CPT

## 2025-05-07 PROCEDURE — 97112 NEUROMUSCULAR REEDUCATION: CPT

## 2025-05-07 PROCEDURE — 97140 MANUAL THERAPY 1/> REGIONS: CPT

## 2025-05-07 PROCEDURE — 97110 THERAPEUTIC EXERCISES: CPT

## 2025-05-07 NOTE — PROGRESS NOTES
Physical Therapy Daily Treatment Note  [facility] Physical Therapy [place]  [address], [address]  [place] [address]     Patient: Carrie Soto  : 1955  Referring practitioner: Pallares, Clara Ann, MD  Today's Date: 2025    VISIT#: 12    Visit Diagnoses:    ICD-10-CM ICD-9-CM   1. Chronic bilateral low back pain without sciatica  M54.50 724.2    G89.29 338.29   2. Decreased range of motion of lumbar spine  M53.86 724.9   3. Decreased ROM of thoracic spine  M53.84 724.9   4. Decreased strength of lower extremity  R29.898 729.89       Subjective   Carrie Soto reports: that she is doing well today, she had a good day yesterday and last night. She was able to go for a walk and sleep through the night.       Objective       See Exercise, Manual, and Modality Logs for complete treatment.     Patient Education: HEP review  Exercise rationale/ pain free exercise performance  Alternate exercise positions  Verbal/Tactile cues to ensure correct exercise performance/technique       Assessment/Plan  Patient demonstrates good tolerance to continued therapeutic exercises on this date with no report of increased pain during or at the end of the session. Introduced IASTM to the L lateral hip musculature and ITB.  Continued with lumbar mobility and LE strength. Introduced a lat stretch with report of targeting the lower back pain. Progressed 3 way thoracic mobility with resistance. Will continue to progress as tolerated.       Progress per Plan of Care and Progress strengthening /stabilization /functional activity          Timed:         Manual Therapy:    8     mins  68152;     Therapeutic Exercise:    10     mins  57807;     Neuromuscular Mary:    15    mins  00281;    Therapeutic Activity:     20     mins  43725;     Gait Training:           mins  56791;     Ultrasound:          mins  16430;    Ionto:                                   mins  35960  Self Care:                       -      mins  50661    Un-Timed:  Electrical Stimulation:         mins  87276 ( );  Dry Needling          mins self-pay  Traction          mins 69539  Re-Eval                               mins  50227  Group Therapy           ___ mins 42959    Timed Treatment:   53   mins   Total Treatment:     53   mins    Deena Harrington PT  Physical Therapist  [facility] license #: [id number]

## 2025-05-12 ENCOUNTER — TREATMENT (OUTPATIENT)
Dept: PHYSICAL THERAPY | Facility: CLINIC | Age: 70
End: 2025-05-12
Payer: MEDICARE

## 2025-05-12 DIAGNOSIS — M53.84 DECREASED ROM OF THORACIC SPINE: ICD-10-CM

## 2025-05-12 DIAGNOSIS — G89.29 CHRONIC BILATERAL LOW BACK PAIN WITHOUT SCIATICA: Primary | ICD-10-CM

## 2025-05-12 DIAGNOSIS — M53.86 DECREASED RANGE OF MOTION OF LUMBAR SPINE: ICD-10-CM

## 2025-05-12 DIAGNOSIS — R29.898 DECREASED STRENGTH OF LOWER EXTREMITY: ICD-10-CM

## 2025-05-12 DIAGNOSIS — M54.50 CHRONIC BILATERAL LOW BACK PAIN WITHOUT SCIATICA: Primary | ICD-10-CM

## 2025-05-12 PROCEDURE — 97110 THERAPEUTIC EXERCISES: CPT

## 2025-05-12 PROCEDURE — 97112 NEUROMUSCULAR REEDUCATION: CPT

## 2025-05-12 NOTE — PROGRESS NOTES
Physical Therapy Daily Treatment Note  [facility] Physical Therapy [place]  [address], [address]  [place] [address]     Patient: Carrie Soto  : 1955  Referring practitioner: Pallares, Clara Ann, MD  Today's Date: 2025    VISIT#: 13    Visit Diagnoses:    ICD-10-CM ICD-9-CM   1. Chronic bilateral low back pain without sciatica  M54.50 724.2    G89.29 338.29   2. Decreased range of motion of lumbar spine  M53.86 724.9   3. Decreased ROM of thoracic spine  M53.84 724.9   4. Decreased strength of lower extremity  R29.898 729.89       Subjective   Carrie Soto reports: that she is seeing good improvement with the upper back pain but continues to be woken up most nights with lower back pain and lateral hip pain. She is going to try to contact the referring provider for a follow up to see if there are any other options/tools to help.      Objective       See Exercise, Manual, and Modality Logs for complete treatment.     Patient Education: HEP review  Exercise rationale/ pain free exercise performance  Alternate exercise positions  Verbal/Tactile cues to ensure correct exercise performance/technique       Assessment/Plan  Patient demonstrates good tolerance to continued and new therapeutic exercises on this date with no report of increased pain during or at the end of the session. Continued with IASTM on this date with good tolerance. Continued with both lumbar and hip mobility and gentle LE strength. Will continue to progress as tolerated.       Progress per Plan of Care and Progress strengthening /stabilization /functional activity          Timed:         Manual Therapy:    8     mins  16178;     Therapeutic Exercise:    10     mins  92246;     Neuromuscular Mary:    12    mins  27878;    Therapeutic Activity:     -     mins  54768;     Gait Training:           mins  99969;     Ultrasound:          mins  75523;    Ionto:                                   mins  72267  Self  Care:                       -     mins  24246    Un-Timed:  Electrical Stimulation:         mins  60464 ( );  Dry Needling          mins self-pay  Traction          mins 00375  Re-Eval                               mins  18772  Group Therapy           ___ mins 91520    Timed Treatment:   30   mins   Total Treatment:     54   mins    Deena Harrington PT  Physical Therapist  [facility] license #: [id number]

## 2025-05-14 ENCOUNTER — TREATMENT (OUTPATIENT)
Dept: PHYSICAL THERAPY | Facility: CLINIC | Age: 70
End: 2025-05-14
Payer: MEDICARE

## 2025-05-14 DIAGNOSIS — M54.50 CHRONIC BILATERAL LOW BACK PAIN WITHOUT SCIATICA: Primary | ICD-10-CM

## 2025-05-14 DIAGNOSIS — G89.29 CHRONIC BILATERAL LOW BACK PAIN WITHOUT SCIATICA: Primary | ICD-10-CM

## 2025-05-14 DIAGNOSIS — M53.84 DECREASED ROM OF THORACIC SPINE: ICD-10-CM

## 2025-05-14 DIAGNOSIS — M53.86 DECREASED RANGE OF MOTION OF LUMBAR SPINE: ICD-10-CM

## 2025-05-14 DIAGNOSIS — R29.898 DECREASED STRENGTH OF LOWER EXTREMITY: ICD-10-CM

## 2025-05-14 PROCEDURE — 97110 THERAPEUTIC EXERCISES: CPT

## 2025-05-14 PROCEDURE — 97112 NEUROMUSCULAR REEDUCATION: CPT

## 2025-05-14 NOTE — PROGRESS NOTES
Physical Therapy Daily Treatment and DC Note  [facility] Physical Therapy [place]  [address], [address]  [place] [address]     Patient: Carrie Soto  : 1955  Referring practitioner: Pallares, Clara Ann, MD  Today's Date: 2025    VISIT#: 14    Visit Diagnoses:    ICD-10-CM ICD-9-CM   1. Chronic bilateral low back pain without sciatica  M54.50 724.2    G89.29 338.29   2. Decreased range of motion of lumbar spine  M53.86 724.9   3. Decreased ROM of thoracic spine  M53.84 724.9   4. Decreased strength of lower extremity  R29.898 729.89       Subjective Evaluation    Pain  Current pain rating: 3  At best pain ratin  At worst pain ratin         Carrie Soto reports: that she is seeing some improvements since last visit, but it changes day to day. She continues to have the back pain in the mornings, when she gets up and moves around this seems to decrease. She is ready for DC at this moment, she reports that she is back to walking 3.5 miles and feels like she has a good HEP to continue with, she is going to try to see pain management to see if there are any other tools to add to help with the lingering pain. She reports that she has seen about a 70% improvement since beginning OPT.       Objective       See Exercise, Manual, and Modality Logs for complete treatment.     Patient Education: HEP review  Exercise rationale/ pain free exercise performance  Alternate exercise positions  Verbal/Tactile cues to ensure correct exercise performance/technique       Assessment/Plan  Patient demonstrates good tolerance to continued therapeutic exercises on this date with no report of increased pain during or at the end of the session. Patient has made great progress with therapeutic interventions and demonstrates good understanding and performance of HEP. She is ready for DC at this time, please see previous progress note for further objective measurements. Patient is hoping to see  pain management for the lingering pain. Answered any questions she had at the time and provided her with my contact information for any  further questions.       Progress per Plan of Care and Progress strengthening /stabilization /functional activity          Timed:         Manual Therapy:    -     mins  12301;     Therapeutic Exercise:    9     mins  73240;     Neuromuscular Mary:    8    mins  14229;    Therapeutic Activity:     8     mins  10080;     Gait Training:           mins  73797;     Ultrasound:          mins  02973;    Ionto:                                   mins  17779  Self Care:                       5     mins  55021    Un-Timed:  Electrical Stimulation:         mins  66031 ( );  Dry Needling          mins self-pay  Traction          mins 30910  Re-Eval                               mins  84911  Group Therapy           ___ mins 89368    Timed Treatment:   30   mins   Total Treatment:     55   mins    Deena Harrington PT  Physical Therapist  [facility] license #: [id number]

## (undated) DEVICE — KT ORCA ORCAPOD DISP STRL

## (undated) DEVICE — CANN NASL CO2 TRULINK W/O2 A/

## (undated) DEVICE — FLEX ADVANTAGE 1500CC: Brand: FLEX ADVANTAGE

## (undated) DEVICE — GOWN PROC ENDOARMOR GI LVL3 HY/SHLD UNIV

## (undated) DEVICE — Device